# Patient Record
Sex: MALE | Race: WHITE | URBAN - METROPOLITAN AREA
[De-identification: names, ages, dates, MRNs, and addresses within clinical notes are randomized per-mention and may not be internally consistent; named-entity substitution may affect disease eponyms.]

---

## 2017-03-07 ENCOUNTER — OP HISTORICAL/CONVERTED ENCOUNTER (OUTPATIENT)
Dept: OTHER | Age: 67
End: 2017-03-07

## 2017-07-10 ENCOUNTER — OP HISTORICAL/CONVERTED ENCOUNTER (OUTPATIENT)
Dept: OTHER | Age: 67
End: 2017-07-10

## 2017-10-02 ENCOUNTER — OP HISTORICAL/CONVERTED ENCOUNTER (OUTPATIENT)
Dept: OTHER | Age: 67
End: 2017-10-02

## 2017-10-03 ENCOUNTER — OP HISTORICAL/CONVERTED ENCOUNTER (OUTPATIENT)
Dept: OTHER | Age: 67
End: 2017-10-03

## 2017-11-02 ENCOUNTER — OP HISTORICAL/CONVERTED ENCOUNTER (OUTPATIENT)
Dept: OTHER | Age: 67
End: 2017-11-02

## 2017-12-04 ENCOUNTER — OP HISTORICAL/CONVERTED ENCOUNTER (OUTPATIENT)
Dept: OTHER | Age: 67
End: 2017-12-04

## 2018-01-03 ENCOUNTER — OP HISTORICAL/CONVERTED ENCOUNTER (OUTPATIENT)
Dept: OTHER | Age: 68
End: 2018-01-03

## 2018-01-09 ENCOUNTER — IP HISTORICAL/CONVERTED ENCOUNTER (OUTPATIENT)
Dept: OTHER | Age: 68
End: 2018-01-09

## 2018-01-15 ENCOUNTER — OP HISTORICAL/CONVERTED ENCOUNTER (OUTPATIENT)
Dept: OTHER | Age: 68
End: 2018-01-15

## 2018-01-19 ENCOUNTER — ED HISTORICAL/CONVERTED ENCOUNTER (OUTPATIENT)
Dept: OTHER | Age: 68
End: 2018-01-19

## 2019-01-28 ENCOUNTER — OP HISTORICAL/CONVERTED ENCOUNTER (OUTPATIENT)
Dept: OTHER | Age: 69
End: 2019-01-28

## 2021-01-26 ENCOUNTER — TELEPHONE (OUTPATIENT)
Dept: FAMILY MEDICINE CLINIC | Age: 71
End: 2021-01-26

## 2021-01-26 ENCOUNTER — TRANSCRIBE ORDER (OUTPATIENT)
Dept: FAMILY MEDICINE CLINIC | Age: 71
End: 2021-01-26

## 2021-02-03 NOTE — TELEPHONE ENCOUNTER
Patient scheduled for:  Appointment Information   Name: Iona Carter MRN: [id number]    Date: 3/1/2021 Status: [facility]    Time: 9:00 AM Length: 30 [id number]   Visit Type: VV SPECIAL USE CASE [[id number]] Copay: $0.00    Provider: Shaylee Herman MD Department: [facility]    Referral #:   Referral Status:      Referring Provider:   Patient Type:      Notes: new patient, est pcp, BLOOD PRESSURE---wife, Mrs. Bañuelos Parents will be with patient, cell---[phone], SATNAM  r/s VV NP est PCP, per request of the wife Asha Post to see Dr. Myra Bermudez in early March, wifi yes, camera yes, ins yes, State: [place], phone: [phone] doxy. me, $0 CP  2/3/2021      Close enc    Thanks  Zora Feng S/PSR  [facility] Referral Coordinator

## 2021-02-03 NOTE — TELEPHONE ENCOUNTER
----- Message from Tahir Abrams sent at 1/26/2021  8:40 AM EST -----  Regarding: Dr. Surinder Rodrigues Message/Vendor Calls    Caller's first and last name:  Rivera Harper, Pt's wife      Reason for call:  Pt's wife requesting to schedule New Patient Appointment in-office for her . Pt prefers male provider. Callback required yes/no and why:  Yes. Pt's wife requesting in-office New Patient Appointment.       Best contact number(s):  [phone]      Details to clarify the request:      Tahir Abrams

## 2021-03-01 ENCOUNTER — VIRTUAL VISIT (OUTPATIENT)
Dept: FAMILY MEDICINE CLINIC | Age: 71
End: 2021-03-01
Payer: MEDICARE

## 2021-03-01 DIAGNOSIS — I10 ESSENTIAL HYPERTENSION: Primary | ICD-10-CM

## 2021-03-01 DIAGNOSIS — E78.2 MIXED HYPERLIPIDEMIA: ICD-10-CM

## 2021-03-01 DIAGNOSIS — M10.9 GOUT, UNSPECIFIED CAUSE, UNSPECIFIED CHRONICITY, UNSPECIFIED SITE: ICD-10-CM

## 2021-03-01 DIAGNOSIS — K21.9 GASTROESOPHAGEAL REFLUX DISEASE, UNSPECIFIED WHETHER ESOPHAGITIS PRESENT: ICD-10-CM

## 2021-03-01 DIAGNOSIS — I25.10 CORONARY ARTERY DISEASE INVOLVING NATIVE CORONARY ARTERY OF NATIVE HEART WITHOUT ANGINA PECTORIS: ICD-10-CM

## 2021-03-01 DIAGNOSIS — J30.89 ENVIRONMENTAL AND SEASONAL ALLERGIES: ICD-10-CM

## 2021-03-01 PROCEDURE — 1101F PT FALLS ASSESS-DOCD LE1/YR: CPT | Performed by: FAMILY MEDICINE

## 2021-03-01 PROCEDURE — G0463 HOSPITAL OUTPT CLINIC VISIT: HCPCS | Performed by: FAMILY MEDICINE

## 2021-03-01 PROCEDURE — G8427 DOCREV CUR MEDS BY ELIG CLIN: HCPCS | Performed by: FAMILY MEDICINE

## 2021-03-01 PROCEDURE — G8432 DEP SCR NOT DOC, RNG: HCPCS | Performed by: FAMILY MEDICINE

## 2021-03-01 PROCEDURE — G8756 NO BP MEASURE DOC: HCPCS | Performed by: FAMILY MEDICINE

## 2021-03-01 PROCEDURE — 3017F COLORECTAL CA SCREEN DOC REV: CPT | Performed by: FAMILY MEDICINE

## 2021-03-01 PROCEDURE — 99204 OFFICE O/P NEW MOD 45 MIN: CPT | Performed by: FAMILY MEDICINE

## 2021-03-01 RX ORDER — ALLOPURINOL 300 MG/1
TABLET ORAL
Qty: 90 TAB | Refills: 0 | Status: SHIPPED | OUTPATIENT
Start: 2021-03-01 | End: 2021-07-27

## 2021-03-01 RX ORDER — OMEPRAZOLE 20 MG/1
CAPSULE, DELAYED RELEASE ORAL
Qty: 90 CAP | Refills: 0 | Status: SHIPPED | OUTPATIENT
Start: 2021-03-01 | End: 2021-07-27

## 2021-03-01 RX ORDER — LISINOPRIL 40 MG/1
TABLET ORAL
COMMUNITY
Start: 2021-02-24 | End: 2021-03-01 | Stop reason: SDUPTHER

## 2021-03-01 RX ORDER — AMLODIPINE BESYLATE 5 MG/1
TABLET ORAL
Qty: 90 TAB | Refills: 0 | Status: SHIPPED | OUTPATIENT
Start: 2021-03-01 | End: 2021-04-26

## 2021-03-01 RX ORDER — AMLODIPINE BESYLATE 5 MG/1
TABLET ORAL
COMMUNITY
Start: 2020-12-06 | End: 2021-03-01 | Stop reason: SDUPTHER

## 2021-03-01 RX ORDER — METOPROLOL SUCCINATE 25 MG/1
TABLET, EXTENDED RELEASE ORAL
Qty: 90 TAB | Refills: 0 | Status: SHIPPED | OUTPATIENT
Start: 2021-03-01 | End: 2021-08-27

## 2021-03-01 RX ORDER — PRAVASTATIN SODIUM 40 MG/1
40 TABLET ORAL
COMMUNITY
End: 2021-03-01 | Stop reason: SDUPTHER

## 2021-03-01 RX ORDER — MONTELUKAST SODIUM 10 MG/1
10 TABLET ORAL DAILY
Qty: 90 TAB | Refills: 0 | Status: SHIPPED | OUTPATIENT
Start: 2021-03-01 | End: 2021-08-12

## 2021-03-01 RX ORDER — PRAVASTATIN SODIUM 40 MG/1
40 TABLET ORAL
Qty: 90 TAB | Refills: 0 | Status: SHIPPED | OUTPATIENT
Start: 2021-03-01 | End: 2021-05-28

## 2021-03-01 RX ORDER — METOPROLOL SUCCINATE 25 MG/1
TABLET, EXTENDED RELEASE ORAL
COMMUNITY
Start: 2020-12-07 | End: 2021-03-01 | Stop reason: SDUPTHER

## 2021-03-01 RX ORDER — MONTELUKAST SODIUM 10 MG/1
TABLET ORAL
COMMUNITY
Start: 2021-02-26 | End: 2021-03-01 | Stop reason: SDUPTHER

## 2021-03-01 RX ORDER — ASPIRIN 81 MG/1
81 TABLET ORAL DAILY
COMMUNITY

## 2021-03-01 RX ORDER — OMEPRAZOLE 20 MG/1
CAPSULE, DELAYED RELEASE ORAL
COMMUNITY
Start: 2021-01-26 | End: 2021-03-01 | Stop reason: SDUPTHER

## 2021-03-01 RX ORDER — ALLOPURINOL 300 MG/1
TABLET ORAL
COMMUNITY
Start: 2021-01-26 | End: 2021-03-01 | Stop reason: SDUPTHER

## 2021-03-01 RX ORDER — CHOLECALCIFEROL (VITAMIN D3) 125 MCG
1 CAPSULE ORAL DAILY
COMMUNITY
End: 2021-04-30 | Stop reason: SDUPTHER

## 2021-03-01 RX ORDER — LISINOPRIL 40 MG/1
40 TABLET ORAL DAILY
Qty: 90 TAB | Refills: 0 | Status: SHIPPED | OUTPATIENT
Start: 2021-03-01 | End: 2021-08-24

## 2021-03-01 NOTE — PROGRESS NOTES
Adore Hensley.  78 y.o. male  1950  [facility]  [id number]   [address]:    Telemedicine Progress Note  Frances Nunez MD       Encounter Date and Time: March 1, 2021 at 9:23 AM    Consent: Adore Gibbs, who was seen by synchronous (real-time) audio-video technology, and/or his healthcare decision maker, is aware that this patient-initiated, Telehealth encounter on 3/1/2021 is a billable service, with coverage as determined by his insurance carrier. He is aware that he may receive a bill and has provided verbal consent to proceed: Yes. Chief Complaint   Patient presents with   [address]    Hypertension     History of Present Illness   Adore Gibbs is a 78 y.o. male was evaluated by synchronous (real-time) audio-video technology from home, through a secure patient portal.  Previous followed by Dr. Caio Mazariegos there is retiring and closing his practice. .     Diarrhea: Notes he is having having stomach problems for 2 years. Once a month he gets diarrhea with unknown cause. Is not currently taking any medications for this. Previously seen by a physician for this approximately 1 year ago and was placed on omeprazole. This helped a little bit but he forgot to take the medication regularly. Is also having to change his gastroenterologist.    Hypertension/hyperlipidemia/CAD: History of cardiac stent in 2014. Currently denies chest pain. Has upcoming appointment with cardiology. Patient reported vitals:  159/84 before meds  163/81 after medications  HR 48-50    Future Appointments   Date Time Provider Michael Gaby   4/5/2021  9:40 AM Huber Davila MD [facility]     Health maintenance:  Had a few colonoscopies: Not currently due      Review of Systems   Review of Systems   Gastrointestinal: Positive for diarrhea.        Vitals/Objective:     General: alert, cooperative, no distress   Mental  status: mental status: alert, oriented to person, place, and time, normal mood, behavior, speech, dress, motor activity, and thought processes   Resp: resp: normal effort and no respiratory distress   Neuro: neuro: no gross deficits   Skin: skin: no discoloration or lesions of concern on visible areas   Due to this being a TeleHealth evaluation, many elements of the physical examination are unable to be assessed. Assessment and Plan:       1. Essential hypertension  Patient need to have medications refilled. And previously provided by his PCP. Currently on Norvasc, lisinopril and Toprol-XL. Heart rate reported as bradycardic, however patient is asymptomatic. Blood pressure appears uncontrolled at this time. Advised to continue to keep a record and discuss his upcoming appointment with his cardiologist.  Likely benefit from increasing his amlodipine and or adding another agent. Patient deferred making adjustments to medication at this time. - amLODIPine (NORVASC) 5 mg tablet; take 1 tablet by mouth daily for blood pressure  Dispense: 90 Tab; Refill: 0  - lisinopriL (PRINIVIL, ZESTRIL) 40 mg tablet; Take 1 Tab by mouth daily. Dispense: 90 Tab; Refill: 0  - metoprolol succinate (TOPROL-XL) 25 mg XL tablet; take 1 tablet by mouth daily  Dispense: 90 Tab; Refill: 0    2. Coronary artery disease involving native coronary artery of native heart without angina pectoris  History of cardiac stent. Upcoming appointment with cardiology. Currently on 81 mg aspirin daily. To complete course of antiplatelet therapies after stent placement. - amLODIPine (NORVASC) 5 mg tablet; take 1 tablet by mouth daily for blood pressure  Dispense: 90 Tab; Refill: 0  - lisinopriL (PRINIVIL, ZESTRIL) 40 mg tablet; Take 1 Tab by mouth daily. Dispense: 90 Tab; Refill: 0  - metoprolol succinate (TOPROL-XL) 25 mg XL tablet; take 1 tablet by mouth daily  Dispense: 90 Tab; Refill: 0  - pravastatin (PRAVACHOL) 40 mg tablet; Take 1 Tab by mouth nightly. Dispense: 90 Tab; Refill: 0    3. Gastroesophageal reflux disease, unspecified whether esophagitis present  Previously controlled with omeprazole. Will refill this today. - omeprazole (PRILOSEC) 20 mg capsule; take 1 capsule by mouth every morning  Dispense: 90 Cap; Refill: 0    4. Mixed hyperlipidemia  No prior records or labs available for review at time of this appointment. Requested labs be sent. We will continue Pravachol at this time. If no recent lipids on review of prior records, patient will need to get updated lab work. Known history of CAD, therefore would recommend LDL goal less than 70.  - pravastatin (PRAVACHOL) 40 mg tablet; Take 1 Tab by mouth nightly. Dispense: 90 Tab; Refill: 0    5. Environmental and seasonal allergies  We will refill Singulair at this time. - montelukast (SINGULAIR) 10 mg tablet; Take 1 Tab by mouth daily. Dispense: 90 Tab; Refill: 0    6. Gout, unspecified cause, unspecified chronicity, unspecified site  Stable on current dose of allopurinol. Will get recent lab work from prior PCP. - allopurinoL (ZYLOPRIM) 300 mg tablet; take 1 tablet by mouth once daily  Dispense: 90 Tab; Refill: 0    Follow-up and Dispositions  ·   Return in about 4 weeks (around 3/29/2021) for Hypertension. We discussed the expected course, resolution and complications of the diagnosis(es) in detail. Medication risks, benefits, costs, interactions, and alternatives were discussed as indicated. I advised him to contact the office if his condition worsens, changes or fails to improve as anticipated. He expressed understanding with the diagnosis(es) and plan. Patient understands that this virtual encounter was a temporary measure, and the importance of further follow up and examination was emphasized. Patient verbalized understanding. Electronically Signed: Alanna Dacosta MD    CPT Codes 41676-20890 for Established Patients may apply to this Telehealth Visit.   POS code: Parker Sofia is a 78 y.o. male who was evaluated by an audio-video encounter for concerns as above. Patient identification was verified prior to start of the visit. A caregiver was present when appropriate. Due to this being a TeleHealth encounter (During BXVXM-06 public health emergency), evaluation of the following organ systems was limited: Vitals/Constitutional/EENT/Resp/CV/GI//MS/Neuro/Skin/Heme-Lymph-Imm. Pursuant to the emergency declaration under the [address] [place] [address], [facility] waiver authority and the [employer] Act, this Virtual Visit was conducted, with patient's (and/or legal guardian's) consent, to reduce the patient's risk of exposure to COVID-19 and provide necessary medical care. Services were provided through a synchronous discussion virtually to substitute for in-person clinic visit. I was at home. The patient was at home. History   Patients past medical, surgical and family histories were reviewed and updated. Past Medical History:   Diagnosis Date    CAD (coronary artery disease)     Essential hypertension     Gout     Hyperlipidemia      Past Surgical History:   Procedure Laterality Date    HX CORONARY STENT PLACEMENT  2014    HX KNEE REPLACEMENT Left 2017     Family History   Problem Relation Age of Onset    Heart Failure Mother     Stroke Father     Diabetes Father     Hypertension Sister     Heart Attack Brother     Diabetes Brother     Diabetes Son     No Known Problems Son     No Known Problems Daughter      Social History     Tobacco Use    Smoking status: Never Smoker    Smokeless tobacco: Never Used   Substance Use Topics    Alcohol use: Not Currently    Drug use: Never     There is no problem list on file for this patient.          Current Medications/Allergies   Medications and Allergies reviewed:    Current Outpatient Medications   Medication Sig Dispense Refill    cholecalciferol, vitamin D3, (Vitamin D3) 50 mcg (2,000 unit) tab Take 1 Tab by mouth daily.  aspirin delayed-release 81 mg tablet Take 81 mg by mouth daily.  allopurinoL (ZYLOPRIM) 300 mg tablet take 1 tablet by mouth once daily 90 Tab 0    amLODIPine (NORVASC) 5 mg tablet take 1 tablet by mouth daily for blood pressure 90 Tab 0    lisinopriL (PRINIVIL, ZESTRIL) 40 mg tablet Take 1 Tab by mouth daily. 90 Tab 0    metoprolol succinate (TOPROL-XL) 25 mg XL tablet take 1 tablet by mouth daily 90 Tab 0    montelukast (SINGULAIR) 10 mg tablet Take 1 Tab by mouth daily. 90 Tab 0    pravastatin (PRAVACHOL) 40 mg tablet Take 1 Tab by mouth nightly.  90 Tab 0    omeprazole (PRILOSEC) 20 mg capsule take 1 capsule by mouth every morning 90 Cap 0       No Known Allergies

## 2021-03-11 PROBLEM — J30.89 ENVIRONMENTAL AND SEASONAL ALLERGIES: Status: ACTIVE | Noted: 2021-03-11

## 2021-03-11 PROBLEM — K21.9 GASTROESOPHAGEAL REFLUX DISEASE: Status: ACTIVE | Noted: 2021-03-11

## 2021-03-24 ENCOUNTER — TELEPHONE (OUTPATIENT)
Dept: FAMILY MEDICINE CLINIC | Age: 71
End: 2021-03-24

## 2021-03-26 NOTE — TELEPHONE ENCOUNTER
----- Message from Margret Lucas sent at 3/24/2021 10:21 AM EDT -----  Regarding: Dr Kiran Khan  General Message/Vendor Calls    Caller's first and last name: Patricio Freeman, Wife      Reason for call: Medical records received (?)      Callback required yes/no and why: Yes, they would like to know if pt's medical records have been received and if not what the next steps should be as the pt's former PCP has closed the office and they are unable to get in touch with them. Best contact number(s): [phone]      Details to clarify the request: Within the last 2 weeks the pt's medical records should have been faxed to your office.  Have they been received (?)      Margret Lucas

## 2021-04-26 ENCOUNTER — OFFICE VISIT (OUTPATIENT)
Dept: CARDIOLOGY CLINIC | Age: 71
End: 2021-04-26
Payer: MEDICARE

## 2021-04-26 VITALS
HEART RATE: 49 BPM | WEIGHT: 275 LBS | SYSTOLIC BLOOD PRESSURE: 170 MMHG | DIASTOLIC BLOOD PRESSURE: 110 MMHG | BODY MASS INDEX: 39.37 KG/M2 | OXYGEN SATURATION: 98 % | HEIGHT: 70 IN

## 2021-04-26 DIAGNOSIS — I10 ESSENTIAL HYPERTENSION: ICD-10-CM

## 2021-04-26 DIAGNOSIS — I25.10 CORONARY ARTERY DISEASE INVOLVING NATIVE CORONARY ARTERY OF NATIVE HEART WITHOUT ANGINA PECTORIS: Primary | ICD-10-CM

## 2021-04-26 DIAGNOSIS — E78.00 PURE HYPERCHOLESTEROLEMIA: ICD-10-CM

## 2021-04-26 PROCEDURE — G8427 DOCREV CUR MEDS BY ELIG CLIN: HCPCS | Performed by: SPECIALIST

## 2021-04-26 PROCEDURE — G8536 NO DOC ELDER MAL SCRN: HCPCS | Performed by: SPECIALIST

## 2021-04-26 PROCEDURE — 99204 OFFICE O/P NEW MOD 45 MIN: CPT | Performed by: SPECIALIST

## 2021-04-26 PROCEDURE — 93005 ELECTROCARDIOGRAM TRACING: CPT | Performed by: SPECIALIST

## 2021-04-26 PROCEDURE — 1101F PT FALLS ASSESS-DOCD LE1/YR: CPT | Performed by: SPECIALIST

## 2021-04-26 PROCEDURE — 3017F COLORECTAL CA SCREEN DOC REV: CPT | Performed by: SPECIALIST

## 2021-04-26 PROCEDURE — G8417 CALC BMI ABV UP PARAM F/U: HCPCS | Performed by: SPECIALIST

## 2021-04-26 PROCEDURE — 93010 ELECTROCARDIOGRAM REPORT: CPT | Performed by: SPECIALIST

## 2021-04-26 PROCEDURE — G8755 DIAS BP > OR = 90: HCPCS | Performed by: SPECIALIST

## 2021-04-26 PROCEDURE — G8753 SYS BP > OR = 140: HCPCS | Performed by: SPECIALIST

## 2021-04-26 PROCEDURE — G8432 DEP SCR NOT DOC, RNG: HCPCS | Performed by: SPECIALIST

## 2021-04-26 PROCEDURE — G0463 HOSPITAL OUTPT CLINIC VISIT: HCPCS | Performed by: SPECIALIST

## 2021-04-26 RX ORDER — AMLODIPINE BESYLATE 10 MG/1
TABLET ORAL
Qty: 30 TAB | Refills: 5 | Status: SHIPPED | OUTPATIENT
Start: 2021-04-26 | End: 2021-10-26

## 2021-04-26 NOTE — PATIENT INSTRUCTIONS
1) lexiscan cardiolite    2) fasting cholesterol at lab [facility]    3) will do sleep evaluation    4) increase the norvasc(amlodipine) to 10 mg a day    5) follow up in 6 weeks    Research Glycemic Index and Glycemic Load on the internet. Vegetables that are low in glycemic Index include artichokes, asparagus, bean sprouts, broccoli, brussels sprouts and cauliflower. Fruits that are low in glycemic index include cherries, grapefruit, dried apricots, pears, apples, oranges, plums and strawberry.       Consider medi weight loss

## 2021-04-26 NOTE — PROGRESS NOTES
René Cheney. is a 78 y.o. male    Visit Vitals  BP (!) 170/110 (BP 1 Location: Left upper arm, BP Patient Position: Sitting, BP Cuff Size: Adult)   Pulse (!) 49   Ht 5' 10\" (1.778 m)   Wt 275 lb (124.7 kg)   SpO2 98%   BMI 39.46 kg/m²       Chief Complaint   Patient presents with    Cholesterol Problem    Coronary Artery Disease    Hypertension       Chest pain SOMETIMES  SOB NO  Dizziness NO  Swelling NO  Recent hospital visit  NO  Refills VITAMIN D3

## 2021-04-26 NOTE — PROGRESS NOTES
CARDIOLOGY OFFICE NOTE    Clayton Champion MD, [address], [place], [address] [facility]  Phone [phone]; Fax [phone]  Mobile [phone]   Voice Mail [phone]    LAST OFFICE VISIT : Visit date not found  Alireza Montemayor MD       ATTENTION:   This medical record was transcribed using an electronic medical records/speech recognition system. Although proofread, it may and can contain electronic, spelling and other errors. Corrections may be executed at a later time. Please feel free to contact us for any clarifications as needed. ICD-10-CM ICD-9-CM   1. Coronary artery disease involving native coronary artery of native heart without angina pectoris  I25.10 414.01   2. Essential hypertension  I10 401.9   3. Pure hypercholesterolemia  E78.00 272.0            Monica Crowe is a 78 y.o. male with  referred for history of CAD stenting LAD, dyslipidemia, hypertension, possible JOVI    . The patient denies chest pain/ shortness of breath, orthopnea, PND, LE edema, palpitations, syncope, presyncope or fatigue. Cardiac risk factors: dyslipidemia, obesity, sedentary life style, male gender, hypertension  I have personally obtained the history from the patient. HISTORY OF PRESENTING ILLNESS      He has been followed by [facility] cardiology in [place]. Is a history of CAD status post stenting of his LAD and 2014. He is states he occasionally gets a little twinge of chest discomfort in his left upper chest but nothing similar to 2014. That was indigestion-like sensation that was relieved actually with a Pepsi or carbonated beverage. He is not very active because of knee pain so he does not walk a lot. He has continued to gain weight over the years. Comes in today with his wife.        ACTIVE PROBLEM LIST     Patient Active Problem List    Diagnosis Date Noted    Gastroesophageal reflux disease 03/11/2021    Environmental and seasonal allergies 03/11/2021    Coronary artery disease involving native coronary artery of native heart without angina pectoris     Essential hypertension     Hyperlipidemia     Gout            PAST MEDICAL HISTORY     Past Medical History:   Diagnosis Date    CAD (coronary artery disease)     Essential hypertension     Gout     Hyperlipidemia            PAST SURGICAL HISTORY     Past Surgical History:   Procedure Laterality Date    HX CORONARY STENT PLACEMENT  2014    HX KNEE REPLACEMENT Left 2017          ALLERGIES     No Known Allergies       FAMILY HISTORY     Family History   Problem Relation Age of Onset    Heart Failure Mother     Stroke Father     Diabetes Father     Hypertension Sister     Heart Attack Brother     Diabetes Brother     Diabetes Son     No Known Problems Son     No Known Problems Daughter     negative for cardiac disease       SOCIAL HISTORY     Social History     Socioeconomic History    Marital status:      Spouse name: German Webb Number of children: 3    Years of education: Not on file    Highest education level: Not on file   Occupational History    Occupation: Retired - [employer]   Tobacco Use    Smoking status: Never Smoker    Smokeless tobacco: Never Used   Substance and Sexual Activity    Alcohol use: Not Currently    Drug use: Never         MEDICATIONS     Current Outpatient Medications   Medication Sig    amLODIPine (NORVASC) 10 mg tablet take 1 tablet by mouth daily for blood pressure    cholecalciferol, vitamin D3, (Vitamin D3) 50 mcg (2,000 unit) tab Take 1 Tab by mouth daily.  aspirin delayed-release 81 mg tablet Take 81 mg by mouth daily.  allopurinoL (ZYLOPRIM) 300 mg tablet take 1 tablet by mouth once daily    lisinopriL (PRINIVIL, ZESTRIL) 40 mg tablet Take 1 Tab by mouth daily.  metoprolol succinate (TOPROL-XL) 25 mg XL tablet take 1 tablet by mouth daily    montelukast (SINGULAIR) 10 mg tablet Take 1 Tab by mouth daily.     pravastatin (PRAVACHOL) 40 mg tablet Take 1 Tab by mouth nightly.  omeprazole (PRILOSEC) 20 mg capsule take 1 capsule by mouth every morning     No current facility-administered medications for this visit. I have reviewed the nurses notes, vitals, problem list, allergy list, medical history, family, social history and medications. REVIEW OF SYMPTOMS   Pertinent positives per HPI  General: Pt denies excessive weight gain or loss. Pt is able to conduct ADL's  HEENT: Denies blurred vision, headaches, hearing loss, epistaxis and difficulty swallowing. Respiratory: Denies cough, congestion, shortness of breath, MENA, wheezing or stridor. Cardiovascular: Denies precordial pain, palpitations, edema or PND  Gastrointestinal: Denies poor appetite, indigestion, abdominal pain or blood in stool  Genitourinary: Denies hematuria, dysuria, increased urinary frequency  Musculoskeletal: Denies joint pain or swelling from muscles or joints  Neurologic: Denies tremor, paresthesias, headache, or sensory motor disturbance  Psychiatric: Denies confusion, insomnia, depression  Integumentray: Denies rash, itching or ulcers. Hematologic: Denies easy bruising, bleeding     PHYSICAL EXAMINATION      Vitals:    04/26/21 1113   BP: (!) 170/110   Pulse: (!) 49   SpO2: 98%   Weight: 275 lb (124.7 kg)   Height: 5' 10\" (1.778 m)     General: Well developed, in no acute distress. HEENT: No jaundice, oral mucosa moist, no oral ulcers  Neck: Supple, no stiffness, no lymphadenopathy, supple  Heart:   Slow and regular  Respiratory: Clear bilaterally x 4, no wheezing or rales  Abdomen:   Soft, non-tender, bowel sounds are active. Extremities:  No edema, normal cap refill, no cyanosis. Musculoskeletal: No clubbing, no deformities  Neuro: A&Ox3, speech clear, gait stable, cooperative, no focal neurologic deficits  Skin: Skin color is normal. No rashes or lesions.  Non diaphoretic, moist. Slight changes lower extremity of venous insufficiency        EKG: I do not have a date on his recent EKG but showed a sinus bradycardia     DIAGNOSTIC DATA     1. Cardiac Cath  2014- stent to LAD    2. Echo  6/24/19- EF 60-65%, mild concentric LV hypertrophy, inferior wall appears mildly hypokinetic, trileaflet AV, aortic root mildly dilated, aortic root diameter 3.8 cm         LABORATORY DATA          No results found for: WBC, HGBPOC, HGB, HGBP, HCTPOC, HCT, PHCT, RBCH, PLT, MCV, HGBEXT, HCTEXT, PLTEXT, HGBEXT, HCTEXT, PLTEXT   No results found for: NA, K, CL, CO2, AGAP, GLU, BUN, CREA, BUCR, GFRAA, GFRNA, CA, TBIL, TBILI, AP, TP, ALB, GLOB, AGRAT, ALT        ASSESSMENT/RECOMMENDATIONS:.   1 hypertension  -Blood pressure is elevated but apparently has not been taking his medicines so will give him at least time until a stress test to recheck his blood pressure was significantly elevated today by his wife and on account he did not take any of his medicines  -encourage weight reduction and exercise; suggest he get a hand foot bike  -Encouraged him to stop the sodas as they may have a significant amount of sodium present  2 dyslipidemia  -Did not have a recent cholesterol on him and his LDL goal should be 70.  -Given a requisition to have his cholesterol  3 CAD  -History of stenting of the LAD back 2014. Now 7 years we will do a stress test last echo was in 2019 with normal EF but he does have LVH  4.  Possible JOVI  -Go forward for with a sleep evaluation      Return in 6 to 8 weeks or as needed       Orders Placed This Encounter    LIPID PANEL     Standing Status:   Future     Standing Expiration Date:   4/26/2022    HEPATIC FUNCTION PANEL     Standing Status:   Future     Standing Expiration Date:   4/26/2022    AMB POC EKG ROUTINE W/ 12 LEADS, INTER & REP     Order Specific Question:   Reason for Exam:     Answer:   CHOL    amLODIPine (NORVASC) 10 mg tablet     Sig: take 1 tablet by mouth daily for blood pressure     Dispense:  30 Tab     Refill:  5 We discussed the expected course, resolution and complications of the diagnosis(es) in detail. Medication risks, benefits, costs, interactions, and alternatives were discussed as indicated. I advised him to contact the office if his condition worsens, changes or fails to improve as anticipated. He expressed understanding with the diagnosis(es) and plan          Follow-up and Dispositions  ·   Return in about 6 weeks (around 6/7/2021). I have discussed the diagnosis with  Gertrude Gao and the intended plan as seen in the above orders. Questions were answered concerning future plans. I have discussed medication side effects and warnings with the patient as well. Thank you,  Vivi Noguera MD for involving me in the care of  Gertrudejohn Alvarado. . Please do not hesitate to contact me for further questions/concerns. Clayton Davila MD, [facility] [address]      [phone] / [phone] Fax

## 2021-04-30 RX ORDER — CHOLECALCIFEROL (VITAMIN D3) 125 MCG
1 CAPSULE ORAL DAILY
Qty: 90 TAB | Refills: 3 | Status: SHIPPED | OUTPATIENT
Start: 2021-04-30 | End: 2022-05-08

## 2021-05-13 ENCOUNTER — TELEPHONE (OUTPATIENT)
Dept: CARDIOLOGY CLINIC | Age: 71
End: 2021-05-13

## 2021-05-13 ENCOUNTER — ANCILLARY PROCEDURE (OUTPATIENT)
Dept: CARDIOLOGY CLINIC | Age: 71
End: 2021-05-13
Payer: MEDICARE

## 2021-05-13 VITALS — HEIGHT: 70 IN | BODY MASS INDEX: 37.22 KG/M2 | WEIGHT: 260 LBS

## 2021-05-13 DIAGNOSIS — I10 ESSENTIAL HYPERTENSION: ICD-10-CM

## 2021-05-13 DIAGNOSIS — I25.10 CORONARY ARTERY DISEASE INVOLVING NATIVE CORONARY ARTERY OF NATIVE HEART WITHOUT ANGINA PECTORIS: ICD-10-CM

## 2021-05-13 DIAGNOSIS — E78.00 PURE HYPERCHOLESTEROLEMIA: ICD-10-CM

## 2021-05-13 PROCEDURE — 93018 CV STRESS TEST I&R ONLY: CPT | Performed by: SPECIALIST

## 2021-05-13 PROCEDURE — 93016 CV STRESS TEST SUPVJ ONLY: CPT | Performed by: SPECIALIST

## 2021-05-13 PROCEDURE — A9500 TC99M SESTAMIBI: HCPCS | Performed by: SPECIALIST

## 2021-05-13 PROCEDURE — 78452 HT MUSCLE IMAGE SPECT MULT: CPT | Performed by: SPECIALIST

## 2021-05-13 RX ORDER — TETRAKIS(2-METHOXYISOBUTYLISOCYANIDE)COPPER(I) TETRAFLUOROBORATE 1 MG/ML
24.3 INJECTION, POWDER, LYOPHILIZED, FOR SOLUTION INTRAVENOUS ONCE
Status: COMPLETED | OUTPATIENT
Start: 2021-05-13 | End: 2021-05-13

## 2021-05-13 RX ADMIN — TECHNETIUM TC 99M SESTAMIBI 24.3 MILLICURIE: 1 INJECTION, POWDER, FOR SOLUTION INTRAVENOUS at 14:00

## 2021-05-13 RX ADMIN — REGADENOSON 0.4 MG: 0.08 INJECTION, SOLUTION INTRAVENOUS at 14:48

## 2021-05-13 NOTE — TELEPHONE ENCOUNTER
Returned pt call, ID [id number]. Pt had  called inquiring which medication to hold before Nuclear stress test. I informed the pt he should NOT take his Toprol this morning. I reviewed the rest of the stress test instructions. Pt expressed understanding and agreement. Pt has no further questions or concerns at this time.

## 2021-05-13 NOTE — TELEPHONE ENCOUNTER
Patient's wife calling in regards to nuclear today. Would like to know if he can still take his medication.     Phone : [phone]

## 2021-05-14 ENCOUNTER — APPOINTMENT (OUTPATIENT)
Dept: CARDIOLOGY CLINIC | Age: 71
End: 2021-05-14

## 2021-05-14 RX ORDER — TETRAKIS(2-METHOXYISOBUTYLISOCYANIDE)COPPER(I) TETRAFLUOROBORATE 1 MG/ML
40 INJECTION, POWDER, LYOPHILIZED, FOR SOLUTION INTRAVENOUS ONCE
Status: COMPLETED | OUTPATIENT
Start: 2021-05-14 | End: 2021-05-14

## 2021-05-14 RX ADMIN — TECHNETIUM TC 99M SESTAMIBI 26.4 MILLICURIE: 1 INJECTION, POWDER, FOR SOLUTION INTRAVENOUS at 14:05

## 2021-05-17 LAB
STRESS BASELINE DIAS BP: 72 MMHG
STRESS BASELINE HR: 45 BPM
STRESS BASELINE SYS BP: 126 MMHG
STRESS O2 SAT PEAK: 98 %
STRESS O2 SAT REST: 97 %
STRESS PEAK DIAS BP: 82 MMHG
STRESS PEAK SYS BP: 142 MMHG
STRESS PERCENT HR ACHIEVED: 41 %
STRESS POST PEAK HR: 62 BPM
STRESS RATE PRESSURE PRODUCT: 8804 BPM*MMHG
STRESS ST DEPRESSION: 0 MM
STRESS ST ELEVATION: 0 MM
STRESS TARGET HR: 152 BPM

## 2021-05-23 DIAGNOSIS — E78.2 MIXED HYPERLIPIDEMIA: ICD-10-CM

## 2021-05-23 DIAGNOSIS — I25.10 CORONARY ARTERY DISEASE INVOLVING NATIVE CORONARY ARTERY OF NATIVE HEART WITHOUT ANGINA PECTORIS: ICD-10-CM

## 2021-05-28 RX ORDER — PRAVASTATIN SODIUM 40 MG/1
TABLET ORAL
Qty: 90 TABLET | Refills: 0 | Status: SHIPPED | OUTPATIENT
Start: 2021-05-28 | End: 2021-06-11

## 2021-06-03 LAB
ALBUMIN SERPL-MCNC: 4.2 G/DL (ref 3.8–4.8)
ALP SERPL-CCNC: 88 IU/L (ref 48–121)
ALT SERPL-CCNC: 21 IU/L (ref 0–44)
AST SERPL-CCNC: 19 IU/L (ref 0–40)
BILIRUB DIRECT SERPL-MCNC: 0.12 MG/DL (ref 0–0.4)
BILIRUB SERPL-MCNC: 0.4 MG/DL (ref 0–1.2)
CHOLEST SERPL-MCNC: 199 MG/DL (ref 100–199)
HDLC SERPL-MCNC: 52 MG/DL
LDLC SERPL CALC-MCNC: 126 MG/DL (ref 0–99)
PROT SERPL-MCNC: 6.8 G/DL (ref 6–8.5)
SPECIMEN STATUS REPORT, ROLRST: NORMAL
TRIGL SERPL-MCNC: 115 MG/DL (ref 0–149)
VLDLC SERPL CALC-MCNC: 21 MG/DL (ref 5–40)

## 2021-06-07 DIAGNOSIS — E78.00 PURE HYPERCHOLESTEROLEMIA: Primary | ICD-10-CM

## 2021-06-07 DIAGNOSIS — I25.10 CORONARY ARTERY DISEASE INVOLVING NATIVE CORONARY ARTERY OF NATIVE HEART WITHOUT ANGINA PECTORIS: ICD-10-CM

## 2021-06-07 NOTE — TELEPHONE ENCOUNTER
Your cholesterol numbers are not at goal. To provide you with the best heart health the LDL should be under 100 if you have no heart disease or history of diabetes. If you have a history of diabetes or heart disease the LDL goal should be less than 70. I would like you to stop the Pravachol and begin Crestor 20 mg a day and recheck your cholesterol in 2 months. Take care and stay healthy.

## 2021-06-11 ENCOUNTER — OFFICE VISIT (OUTPATIENT)
Dept: CARDIOLOGY CLINIC | Age: 71
End: 2021-06-11
Payer: MEDICARE

## 2021-06-11 VITALS
HEART RATE: 56 BPM | SYSTOLIC BLOOD PRESSURE: 135 MMHG | DIASTOLIC BLOOD PRESSURE: 65 MMHG | HEIGHT: 70 IN | BODY MASS INDEX: 37.51 KG/M2 | WEIGHT: 262 LBS

## 2021-06-11 DIAGNOSIS — I25.10 CORONARY ARTERY DISEASE INVOLVING NATIVE CORONARY ARTERY OF NATIVE HEART WITHOUT ANGINA PECTORIS: ICD-10-CM

## 2021-06-11 DIAGNOSIS — I10 ESSENTIAL HYPERTENSION: Primary | ICD-10-CM

## 2021-06-11 DIAGNOSIS — E78.00 PURE HYPERCHOLESTEROLEMIA: ICD-10-CM

## 2021-06-11 PROCEDURE — G8427 DOCREV CUR MEDS BY ELIG CLIN: HCPCS | Performed by: SPECIALIST

## 2021-06-11 PROCEDURE — G8432 DEP SCR NOT DOC, RNG: HCPCS | Performed by: SPECIALIST

## 2021-06-11 PROCEDURE — 3017F COLORECTAL CA SCREEN DOC REV: CPT | Performed by: SPECIALIST

## 2021-06-11 PROCEDURE — G8754 DIAS BP LESS 90: HCPCS | Performed by: SPECIALIST

## 2021-06-11 PROCEDURE — G8417 CALC BMI ABV UP PARAM F/U: HCPCS | Performed by: SPECIALIST

## 2021-06-11 PROCEDURE — G8752 SYS BP LESS 140: HCPCS | Performed by: SPECIALIST

## 2021-06-11 PROCEDURE — 99214 OFFICE O/P EST MOD 30 MIN: CPT | Performed by: SPECIALIST

## 2021-06-11 PROCEDURE — 1101F PT FALLS ASSESS-DOCD LE1/YR: CPT | Performed by: SPECIALIST

## 2021-06-11 PROCEDURE — G0463 HOSPITAL OUTPT CLINIC VISIT: HCPCS | Performed by: SPECIALIST

## 2021-06-11 PROCEDURE — G8536 NO DOC ELDER MAL SCRN: HCPCS | Performed by: SPECIALIST

## 2021-06-11 RX ORDER — ROSUVASTATIN CALCIUM 20 MG/1
20 TABLET, COATED ORAL
Qty: 30 TABLET | Refills: 5 | Status: SHIPPED | OUTPATIENT
Start: 2021-06-11 | End: 2021-12-09

## 2021-06-11 NOTE — PROGRESS NOTES
CARDIOLOGY OFFICE NOTE    Clayton Jack MD, [address], [place], [address] [facility]  Phone [phone]; Fax [phone]  Mobile [phone]   Voice Mail [phone]    LAST OFFICE VISIT : Visit date not found  Ashley Randolph MD       ATTENTION:   This medical record was transcribed using an electronic medical records/speech recognition system. Although proofread, it may and can contain electronic, spelling and other errors. Corrections may be executed at a later time. Please feel free to contact us for any clarifications as needed. ICD-10-CM ICD-9-CM   1. Essential hypertension  I10 401.9   2. Coronary artery disease involving native coronary artery of native heart without angina pectoris  I25.10 414.01   3. Pure hypercholesterolemia  E78.00 272.0            Eli Barclay is a 78 y.o. male with  referred for history of CAD stenting LAD, dyslipidemia, hypertension, possible JOVI    . The patient denies chest pain/ shortness of breath, orthopnea, PND, LE edema, palpitations, syncope, presyncope or fatigue. Cardiac risk factors: dyslipidemia, obesity, sedentary life style, male gender, hypertension  I have personally obtained the history from the patient. HISTORY OF PRESENTING ILLNESS   From prior note  He has been followed by [facility] cardiology in [place]. Is a history of CAD status post stenting of his LAD and 2014. He is states he occasionally gets a little twinge of chest discomfort in his left upper chest but nothing similar to 2014. That was indigestion-like sensation that was relieved actually with a Pepsi or carbonated beverage. He is not very active because of knee pain so he does not walk a lot. He has continued to gain weight over the years. He is doing well with no interval cardiac issues.   No chest pain or shortness of breath comes in to review all of his testing       ACTIVE PROBLEM LIST     Patient Active Problem List Diagnosis Date Noted    Gastroesophageal reflux disease 03/11/2021    Environmental and seasonal allergies 03/11/2021    Coronary artery disease involving native coronary artery of native heart without angina pectoris     Essential hypertension     Hyperlipidemia     Gout            PAST MEDICAL HISTORY     Past Medical History:   Diagnosis Date    CAD (coronary artery disease)     Essential hypertension     Gout     Hyperlipidemia            PAST SURGICAL HISTORY     Past Surgical History:   Procedure Laterality Date    HX CORONARY STENT PLACEMENT  2014    HX KNEE REPLACEMENT Left 2017          ALLERGIES     No Known Allergies       FAMILY HISTORY     Family History   Problem Relation Age of Onset    Heart Failure Mother     Stroke Father     Diabetes Father     Hypertension Sister     Heart Attack Brother     Diabetes Brother     Diabetes Son     No Known Problems Son     No Known Problems Daughter     negative for cardiac disease       SOCIAL HISTORY     Social History     Socioeconomic History    Marital status:      Spouse name: Tru Rinaldi Number of children: 3    Years of education: Not on file    Highest education level: Not on file   Occupational History    Occupation: Retired - [employer]   Tobacco Use    Smoking status: Never Smoker    Smokeless tobacco: Never Used   Substance and Sexual Activity    Alcohol use: Not Currently    Drug use: Never     Social Determinants of Health     Financial Resource Strain:     Difficulty of Paying Living Expenses:    Food Insecurity:     Worried About Running Out of Food in the Last Year:     [address] in the Last Year:    Transportation Needs:     Lack of Transportation (Medical):      Lack of Transportation (Non-Medical):    Physical Activity:     Days of Exercise per Week:     Minutes of Exercise per Session:    Stress:     Feeling of Stress :    Social Connections:     Frequency of Communication with Friends and Family:     Frequency of Social Gatherings with Friends and Family:     Attends Sabianism Services:     Active Member of Clubs or Organizations:     Attends Club or Organization Meetings:     Marital Status:          MEDICATIONS     Current Outpatient Medications   Medication Sig    pravastatin (PRAVACHOL) 40 mg tablet take 1 tablet by mouth nightly    cholecalciferol, vitamin D3, (Vitamin D3) 50 mcg (2,000 unit) tab Take 1 Tab by mouth daily.  amLODIPine (NORVASC) 10 mg tablet take 1 tablet by mouth daily for blood pressure    aspirin delayed-release 81 mg tablet Take 81 mg by mouth daily.  allopurinoL (ZYLOPRIM) 300 mg tablet take 1 tablet by mouth once daily    lisinopriL (PRINIVIL, ZESTRIL) 40 mg tablet Take 1 Tab by mouth daily.  metoprolol succinate (TOPROL-XL) 25 mg XL tablet take 1 tablet by mouth daily    montelukast (SINGULAIR) 10 mg tablet Take 1 Tab by mouth daily.  omeprazole (PRILOSEC) 20 mg capsule take 1 capsule by mouth every morning     No current facility-administered medications for this visit. I have reviewed the nurses notes, vitals, problem list, allergy list, medical history, family, social history and medications. REVIEW OF SYMPTOMS   Pertinent positives per HPI  General: Pt denies excessive weight gain or loss. Pt is able to conduct ADL's  HEENT: Denies blurred vision, headaches, hearing loss, epistaxis and difficulty swallowing. Respiratory: Denies cough, congestion, shortness of breath, MENA, wheezing or stridor.   Cardiovascular: Denies precordial pain, palpitations, edema or PND  Gastrointestinal: Denies poor appetite, indigestion, abdominal pain or blood in stool  Genitourinary: Denies hematuria, dysuria, increased urinary frequency  Musculoskeletal: Denies joint pain or swelling from muscles or joints  Neurologic: Denies tremor, paresthesias, headache, or sensory motor disturbance  Psychiatric: Denies confusion, insomnia, depression  Integumentray: Denies rash, itching or ulcers. Hematologic: Denies easy bruising, bleeding     PHYSICAL EXAMINATION      Vitals:    06/11/21 1202   BP: 135/65   Pulse: (!) 56   Weight: 262 lb (118.8 kg)   Height: 5' 10\" (1.778 m)     General: Well developed, in no acute distress. HEENT: No jaundice, oral mucosa moist, no oral ulcers  Neck: Supple, no stiffness, no lymphadenopathy, supple  Heart:   Slow and regular  Respiratory: Clear bilaterally x 4, no wheezing or rales        Extremities:  No edema, normal cap refill, no cyanosis. Musculoskeletal: No clubbing, no deformities  Neuro: A&Ox3, speech clear, gait stable, cooperative, no focal neurologic deficits            EKG: I do not have a date on his recent EKG but showed a sinus bradycardia     DIAGNOSTIC DATA     1. Cardiac Cath   2014- stent to LAD     2. Echo   6/24/19- EF 60-65%, mild concentric LV hypertrophy, inferior wall appears mildly hypokinetic, trileaflet AV, aortic root mildly dilated, aortic root diameter 3.8 cm     3. Stress Test   5/13/21-Lexiscan/Cardiolite-no ischemia    4. Lipids  6/2/21- 6/2/21- , HDL 52, ,          LABORATORY DATA          No results found for: WBC, HGBPOC, HGB, HGBP, HCTPOC, HCT, PHCT, RBCH, PLT, MCV, HGBEXT, HCTEXT, PLTEXT, HGBEXT, HCTEXT, PLTEXT   Lab Results   Component Value Date/Time    Bilirubin, total 0.4 06/02/2021 10:45 AM    Alk. phosphatase 88 06/02/2021 10:45 AM    Protein, total 6.8 06/02/2021 10:45 AM    Albumin 4.2 06/02/2021 10:45 AM    ALT (SGPT) 21 06/02/2021 10:45 AM           ASSESSMENT/RECOMMENDATIONS:.   1 hypertension  -Blood pressure  is good today on current medical regimen no adjustments continue diet low in sodium  2 dyslipidemia  -LDL is not at goal it was 126 him to stop his Pravachol start Crestor 20 mg a day recheck his cholesterol in 2 months  3 CAD  -History of stenting of the LAD back 2014.  Now 7 years we will do a stress test last echo was in 2019 with normal EF but he does have LVH  -Stress test was normal   4. Possible JOVI  -Failed asking about sleep study      Return in 3 months       No orders of the defined types were placed in this encounter. We discussed the expected course, resolution and complications of the diagnosis(es) in detail. Medication risks, benefits, costs, interactions, and alternatives were discussed as indicated. I advised him to contact the office if his condition worsens, changes or fails to improve as anticipated. He expressed understanding with the diagnosis(es) and plan          Follow-up and Dispositions  ·   Return in about 6 months (around 12/11/2021). I have discussed the diagnosis with  Lord LyonsLucas and the intended plan as seen in the above orders. Questions were answered concerning future plans. I have discussed medication side effects and warnings with the patient as well. Thank you,  Steven Connor MD for involving me in the care of  Lord Lyon . Please do not hesitate to contact me for further questions/concerns. Clayton Davlia MD, [facility] [address]  [address] [place] [address], [address] [place] [address] Drive      [phone] / [phone] Fax

## 2021-06-11 NOTE — PATIENT INSTRUCTIONS
1) stop the Pravachol also known as pravastatin 2) begin Crestor also known as rosuvastatin 20 mg at night to lower your LDL under 70 
 
3) I would obtain coenzyme Q 10 200 mg a day over-the-counter to help with any muscle aching. 4) have your cholesterol checked again in 2 months. 5) your stress test that was done on 5/13/2021 was normal with no evidence of significant coronary artery disease. 6) today's blood pressure was 135/65 7) follow-up with me in 3 months

## 2021-06-22 ENCOUNTER — TELEPHONE (OUTPATIENT)
Dept: FAMILY MEDICINE CLINIC | Age: 71
End: 2021-06-22

## 2021-06-22 NOTE — TELEPHONE ENCOUNTER
Per from from pt wife seeking appt for in-person. Notes her  is asthmatic in has a bad cough. Pt is fully vaccinated with [employer] shot. No avail at call until 6/25/21. Pt doesn't want to wait will go to Urgent Care to be seen.

## 2021-07-28 ENCOUNTER — OFFICE VISIT (OUTPATIENT)
Dept: FAMILY MEDICINE CLINIC | Age: 71
End: 2021-07-28
Payer: MEDICARE

## 2021-07-28 VITALS
HEIGHT: 70 IN | WEIGHT: 267 LBS | OXYGEN SATURATION: 95 % | SYSTOLIC BLOOD PRESSURE: 112 MMHG | HEART RATE: 51 BPM | RESPIRATION RATE: 16 BRPM | DIASTOLIC BLOOD PRESSURE: 70 MMHG | TEMPERATURE: 98.4 F | BODY MASS INDEX: 38.22 KG/M2

## 2021-07-28 DIAGNOSIS — Z00.00 MEDICARE ANNUAL WELLNESS VISIT, SUBSEQUENT: Primary | ICD-10-CM

## 2021-07-28 DIAGNOSIS — Z71.89 ADVANCED DIRECTIVES, COUNSELING/DISCUSSION: ICD-10-CM

## 2021-07-28 DIAGNOSIS — I10 ESSENTIAL HYPERTENSION: ICD-10-CM

## 2021-07-28 DIAGNOSIS — Z78.9 FULL CODE STATUS: ICD-10-CM

## 2021-07-28 DIAGNOSIS — Z11.59 ENCOUNTER FOR HEPATITIS C SCREENING TEST FOR LOW RISK PATIENT: ICD-10-CM

## 2021-07-28 DIAGNOSIS — E66.9 OBESITY, CLASS II, BMI 35-39.9: ICD-10-CM

## 2021-07-28 DIAGNOSIS — E78.2 MIXED HYPERLIPIDEMIA: ICD-10-CM

## 2021-07-28 PROCEDURE — G8754 DIAS BP LESS 90: HCPCS | Performed by: FAMILY MEDICINE

## 2021-07-28 PROCEDURE — G8427 DOCREV CUR MEDS BY ELIG CLIN: HCPCS | Performed by: FAMILY MEDICINE

## 2021-07-28 PROCEDURE — G0439 PPPS, SUBSEQ VISIT: HCPCS | Performed by: FAMILY MEDICINE

## 2021-07-28 PROCEDURE — G8510 SCR DEP NEG, NO PLAN REQD: HCPCS | Performed by: FAMILY MEDICINE

## 2021-07-28 PROCEDURE — 3017F COLORECTAL CA SCREEN DOC REV: CPT | Performed by: FAMILY MEDICINE

## 2021-07-28 PROCEDURE — G8536 NO DOC ELDER MAL SCRN: HCPCS | Performed by: FAMILY MEDICINE

## 2021-07-28 PROCEDURE — G8417 CALC BMI ABV UP PARAM F/U: HCPCS | Performed by: FAMILY MEDICINE

## 2021-07-28 PROCEDURE — G8752 SYS BP LESS 140: HCPCS | Performed by: FAMILY MEDICINE

## 2021-07-28 PROCEDURE — 1101F PT FALLS ASSESS-DOCD LE1/YR: CPT | Performed by: FAMILY MEDICINE

## 2021-07-28 RX ORDER — PRAVASTATIN SODIUM 40 MG/1
40 TABLET ORAL
COMMUNITY
End: 2021-12-09 | Stop reason: ALTCHOICE

## 2021-07-28 NOTE — PROGRESS NOTES
This is the Subsequent Medicare Annual Wellness Exam, performed 12 months or more after the Initial AWV or the last Subsequent AWV    I have reviewed the patient's medical history in detail and updated the computerized patient record. Assessment/Plan   Education and counseling provided:  Are appropriate based on today's review and evaluation  End-of-Life planning (with patient's consent)  Colorectal cancer screening tests  Immunizations    1. Medicare annual wellness visit, subsequent  Michelle Parr. was counseled on age-appropriate/ guideline-based risk prevention behaviors and screening for a 73y.o. year old   male . We also discussed adjustments in screening based on family history if necessary. Printed instructions for preventative screening guidelines and healthy behaviors given to patient with after visit summary. Discussed recommended immunizations and colorectal cancer screening. Last Colonoscopy done 1/28/2019 by Dr. Venkat Solorzano. Polyps removed and sent to path w/o evidence of  \"Adenomatous changes or colitis. \"  Full immunization record not available. Pt wished to defer vacine at this time. 2. Encounter for hepatitis C screening test for low risk patient  -     HEPATITIS C AB; Future    3. Essential hypertension  -     CBC W/O DIFF; Future  -     METABOLIC PANEL, COMPREHENSIVE; Future  -     HEMOGLOBIN A1C WITH EAG; Future  4. Mixed hyperlipidemia  -     LIPID PANEL; Future  5. Obesity, Class II, BMI 35-39.9  -     HEMOGLOBIN A1C WITH EAG; Future  6. Full code status  -     FULL CODE  7. Advanced directives, counseling/discussion  -     FULL CODE  8.  Body mass index 38.0-38.9, adult       Depression Risk Factor Screening     3 most recent PHQ Screens 7/28/2021   Little interest or pleasure in doing things Not at all   Feeling down, depressed, irritable, or hopeless Not at all   Total Score PHQ 2 0       Alcohol Risk Screen    Do you average more than 1 drink per night or more than 7 drinks a week: No    In the past three months have you have had more than 4 drinks containing alcohol on one occasion: No        Functional Ability and Level of Safety    Hearing: The patient needs further evaluation. Deferred at tis time      Activities of Daily Living: The home contains: handrails      Ambulation: with no difficulty     Fall Risk:  Fall Risk Assessment, last 12 mths 7/28/2021   Able to walk? Yes   Fall in past 12 months? 0   Do you feel unsteady? 0   Are you worried about falling 0      Abuse Screen:  Patient is not abused       Cognitive Screening    Has your family/caregiver stated any concerns about your memory: no     Health Maintenance Due     Health Maintenance Due   Topic Date Due    COVID-19 Vaccine (1) Never done    DTaP/Tdap/Td series (1 - Tdap) Never done    Shingrix Vaccine Age 50> (1 of 2) Never done    Pneumococcal 65+ years (1 of 1 - PPSV23) Never done       Patient Care Team   Patient Care Team:  Herminia De La Cruz MD as PCP - General (Family Medicine)  Herminia De La Cruz MD as PCP - [facility]  Eric Haque MD (Cardiology)  Gifty Castillo MD (Gastroenterology)    History     Patient Active Problem List   Diagnosis Code    Coronary artery disease involving native coronary artery of native heart without angina pectoris I25.10    Essential hypertension I10    Hyperlipidemia E78.5    Gout M10.9    Gastroesophageal reflux disease K21.9    Environmental and seasonal allergies J30.89     Past Medical History:   Diagnosis Date    CAD (coronary artery disease)     Essential hypertension     Gout     Hyperlipidemia       Past Surgical History:   Procedure Laterality Date    HX CORONARY STENT PLACEMENT  2014    HX KNEE REPLACEMENT Left 2017     Current Outpatient Medications   Medication Sig Dispense Refill    pravastatin (PRAVACHOL) 40 mg tablet Take 40 mg by mouth nightly.       omeprazole (PRILOSEC) 20 mg capsule take 1 capsule by mouth every morning 90 Capsule 1    allopurinoL (ZYLOPRIM) 300 mg tablet take 1 tablet by mouth once daily 90 Tablet 1    cholecalciferol, vitamin D3, (Vitamin D3) 50 mcg (2,000 unit) tab Take 1 Tab by mouth daily. 90 Tab 3    amLODIPine (NORVASC) 10 mg tablet take 1 tablet by mouth daily for blood pressure 30 Tab 5    aspirin delayed-release 81 mg tablet Take 81 mg by mouth daily.  lisinopriL (PRINIVIL, ZESTRIL) 40 mg tablet Take 1 Tab by mouth daily. 90 Tab 0    metoprolol succinate (TOPROL-XL) 25 mg XL tablet take 1 tablet by mouth daily 90 Tab 0    montelukast (SINGULAIR) 10 mg tablet Take 1 Tab by mouth daily. 90 Tab 0    rosuvastatin (CRESTOR) 20 mg tablet Take 1 Tablet by mouth nightly.  (Patient not taking: Reported on 7/28/2021) 30 Tablet 5     No Known Allergies    Family History   Problem Relation Age of Onset    Heart Failure Mother     Stroke Father     Diabetes Father     Hypertension Sister     Heart Attack Brother     Diabetes Brother     Diabetes Son     No Known Problems Son     No Known Problems Daughter      Social History     Tobacco Use    Smoking status: Never Smoker    Smokeless tobacco: Never Used   Substance Use Topics    Alcohol use: Not Currently         Belle Coker MD

## 2021-07-28 NOTE — PROGRESS NOTES
Vivien Bae. is a 78 y.o. male    Chief Complaint   Patient presents with    Follow Up Chronic Condition       1. Have you been to the ER, urgent care clinic since your last visit? Hospitalized since your last visit? [facility] month and a half ago  2. Have you seen or consulted any other health care providers outside of the [address] [place] [address] since your last visit? Include any pap smears or colon screening.  No    Visit Vitals  /70 (BP 1 Location: Left arm, BP Patient Position: Sitting)   Pulse (!) 51   Temp 98.4 °F (36.9 °C) (Oral)   Resp 16   Ht 5' 10\" (1.778 m)   Wt 267 lb (121.1 kg)   SpO2 95%   BMI 38.31 kg/m²

## 2021-07-28 NOTE — PROGRESS NOTES
General Health Questions   -During the past 4 weeks:   -how would you rate your health in general? Good   -how often have you been bothered by feeling dizzy when standing up? Some days (1-2)   -how much have you been bothered by bodily pain? mildly   -Have you noticed any hearing difficulties? yes   -has your physical and emotional health limited your social activities with family or friends? no    Emotional Health Questions   -Do you have a history of depression, anxiety, or emotional problems? no  -Over the past 2 weeks, have you felt down, depressed or hopeless? yes  -Over the past 2 weeks, have you felt little interest or pleasure in doing things? no    Health Habits   Please describe your diet habits: eats 3 meals a day  Do you get 5 servings of fruits or vegetables daily? no  Do you exercise regularly? no    Activities of Daily Living and Functional Status   -Do you need help with eating, walking, dressing, bathing, toileting, the phone, transportation, shopping, preparing meals, housework, laundry, medications or managing money? no  -In the past four weeks, was someone available to help you if you needed and wanted help with anything? yes  -Are you confident are you that you can control and manage most of your health problems? yes  -Have you been given information to help you keep track of your medications? yes  -How often do you have trouble taking your medications as prescribed? never    Fall Risk and Home Safety   Have you fallen 2 or more times in the past year? no  Does your home have rugs in the hallways? no, Do you have grab bars in the bathrooms?  no, Does your home have handrails on the stairs? yes, Do you have adequate lighting in your home? yes  Do you have smoke detectors and check them regularly?  yes  Do you have difficulties driving a car/vehicle? no  Do you always fasten your seat belt when you are in a car? no

## 2021-07-28 NOTE — PATIENT INSTRUCTIONS
Medicare Wellness Visit, Male    The best way to live healthy is to have a lifestyle where you eat a well-balanced diet, exercise regularly, limit alcohol use, and quit all forms of tobacco/nicotine, if applicable. Regular preventive services are another way to keep healthy. Preventive services (vaccines, screening tests, monitoring & exams) can help personalize your care plan, which helps you manage your own care. Screening tests can find health problems at the earliest stages, when they are easiest to treat. [facility] follows the current, evidence-based guidelines published by the [facility] ([facility]) when recommending preventive services for our patients. Because we follow these guidelines, sometimes recommendations change over time as research supports it. (For example, a prostate screening blood test is no longer routinely recommended for men with no symptoms). Of course, you and your doctor may decide to screen more often for some diseases, based on your risk and co-morbidities (chronic disease you are already diagnosed with). Preventive services for you include:  - Medicare offers their members a free annual wellness visit, which is time for you and your primary care provider to discuss and plan for your preventive service needs. Take advantage of this benefit every year!  -All adults over age 72 should receive the recommended pneumonia vaccines. Current USPSTF guidelines recommend a series of two vaccines for the best pneumonia protection.   -All adults should have a flu vaccine yearly and tetanus vaccine every 10 years.  -All adults age 48 and older should receive the shingles vaccines (series of two vaccines).        -All adults age 38-68 who are overweight should have a diabetes screening test once every three years.   -Other screening tests & preventive services for persons with diabetes include: an eye exam to screen for diabetic retinopathy, a kidney function test, a foot exam, and stricter control over your cholesterol.   -Cardiovascular screening for adults with routine risk involves an electrocardiogram (ECG) at intervals determined by the provider.   -Colorectal cancer screening should be done for adults age 54-65 with no increased risk factors for colorectal cancer. There are a number of acceptable methods of screening for this type of cancer. Each test has its own benefits and drawbacks. Discuss with your provider what is most appropriate for you during your annual wellness visit. The different tests include: colonoscopy (considered the best screening method), a fecal occult blood test, a fecal DNA test, and sigmoidoscopy.  -All adults born between [facility] should be screened once for Hepatitis C.  -An Abdominal Aortic Aneurysm (AAA) Screening is recommended for men age 73-68 who has ever smoked in their lifetime. Here is a list of your current Health Maintenance items (your personalized list of preventive services) with a due date:  Health Maintenance Due   Topic Date Due    Hepatitis C Test  Never done    COVID-19 Vaccine (1) Never done    DTaP/Tdap/Td  (1 - Tdap) Never done    Colorectal Screening  Never done    Shingles Vaccine (1 of 2) Never done    Pneumococcal Vaccine (1 of 1 - PPSV23) Never done          Advance Care Planning: Care Instructions  Your Care Instructions  It can be hard to live with an illness that cannot be cured. But if your health is getting worse, you may want to make decisions about end-of-life care. Planning for the end of your life does not mean that you are giving up. It is a way to make sure that your wishes are met. Clearly stating your wishes can make it easier for your loved ones. Making plans while you are still able may also ease your mind and make your final days less stressful and more meaningful. Follow-up care is a key part of your treatment and safety.  Be sure to make and go to all appointments, and call your doctor if you are having problems. It's also a good idea to know your test results and keep a list of the medicines you take. What can you do to plan for the end of life? · You can bring these issues up with your doctor. You do not need to wait until your doctor starts the conversation. You might start with \"I would not be willing to live with . Judy Sida Judy Sida Judy Sida \" When you complete this sentence it helps your doctor understand your wishes. · Talk openly and honestly with your doctor. This is the best way to understand the decisions you will need to make as your health changes. Know that you can always change your mind. · Ask your doctor about commonly used life-support measures. These include tube feedings, breathing machines, and fluids given through a vein (IV). Understanding these treatments will help you decide whether you want them. · You may choose to have these life-supporting treatments for a limited time. This allows a trial period to see whether they will help you. You may also decide that you want your doctor to take only certain measures to keep you alive. It is important to spell out these conditions so that your doctor and family understand them. · Talk to your doctor about how long you are likely to live. He or she may be able to give you an idea of what usually happens with your specific illness. · Think about preparing papers that state your wishes. This way there will not be any confusion about what you want. You can change your instructions at any time. Which papers should you prepare? Advance directives are legal papers that tell doctors how you want to be cared for at the end of your life. You do not need a  to write these papers. Ask your doctor or your state health department for information on how to write your advance directives. They may have the forms for each of these types of papers.  Make sure your doctor has a copy of these on file, and give a copy to a family member or close friend. · Consider a do-not-resuscitate order (DNR). This order asks that no extra treatments be done if your heart stops or you stop breathing. Extra treatments may include cardiopulmonary resuscitation (CPR), electrical shock to restart your heart, or a machine to breathe for you. If you decide to have a DNR order, ask your doctor to explain and write it. Place the order in your home where everyone can easily see it. · Consider a living will. A living will explains your wishes about life support and other treatments at the end of your life if you become unable to speak for yourself. Living fisher tell doctors to use or not use treatments that would keep you alive. You must have one or two witnesses or a notary present when you sign this form. · Consider a durable power of  for health care. This allows you to name a person to make decisions about your care if you are not able to. Most people ask a close friend or family member. Talk to this person about the kinds of treatments you want and those that you do not want. Make sure this person understands your wishes. These legal papers are simple to change. Tell your doctor what you want to change, and ask him or her to make a note in your medical file. Give your family updated copies of the papers. Where can you learn more? Go to http://www.gray.com/. Enter P184 in the search box to learn more about \"Advance Care Planning: Care Instructions. \"  Current as of: November 17, 2016  Content Version: 11.3  © 6020-4860 [employer]. Care instructions adapted under license by [employer] (which disclaims liability or warranty for this information). If you have questions about a medical condition or this instruction, always ask your healthcare professional. [address] any warranty or liability for your use of this information.

## 2021-08-03 ENCOUNTER — LAB ONLY (OUTPATIENT)
Dept: FAMILY MEDICINE CLINIC | Age: 71
End: 2021-08-03

## 2021-08-03 DIAGNOSIS — Z11.59 ENCOUNTER FOR HEPATITIS C SCREENING TEST FOR LOW RISK PATIENT: ICD-10-CM

## 2021-08-03 DIAGNOSIS — E66.9 OBESITY, CLASS II, BMI 35-39.9: ICD-10-CM

## 2021-08-03 DIAGNOSIS — I10 ESSENTIAL HYPERTENSION: ICD-10-CM

## 2021-08-03 DIAGNOSIS — E78.2 MIXED HYPERLIPIDEMIA: ICD-10-CM

## 2021-08-03 DIAGNOSIS — Z11.59 NEED FOR HEPATITIS C SCREENING TEST: ICD-10-CM

## 2021-08-04 LAB
ALBUMIN SERPL-MCNC: 3.7 G/DL (ref 3.5–5)
ALBUMIN/GLOB SERPL: 1.2 {RATIO} (ref 1.1–2.2)
ALP SERPL-CCNC: 84 U/L (ref 45–117)
ALT SERPL-CCNC: 26 U/L (ref 12–78)
ANION GAP SERPL CALC-SCNC: 8 MMOL/L (ref 5–15)
AST SERPL-CCNC: 16 U/L (ref 15–37)
BILIRUB SERPL-MCNC: 0.5 MG/DL (ref 0.2–1)
BUN SERPL-MCNC: 27 MG/DL (ref 6–20)
BUN/CREAT SERPL: 20 (ref 12–20)
CALCIUM SERPL-MCNC: 9.1 MG/DL (ref 8.5–10.1)
CHLORIDE SERPL-SCNC: 109 MMOL/L (ref 97–108)
CHOLEST SERPL-MCNC: 189 MG/DL
CO2 SERPL-SCNC: 23 MMOL/L (ref 21–32)
CREAT SERPL-MCNC: 1.38 MG/DL (ref 0.7–1.3)
ERYTHROCYTE [DISTWIDTH] IN BLOOD BY AUTOMATED COUNT: 14.1 % (ref 11.5–14.5)
EST. AVERAGE GLUCOSE BLD GHB EST-MCNC: 117 MG/DL
GLOBULIN SER CALC-MCNC: 3.1 G/DL (ref 2–4)
GLUCOSE SERPL-MCNC: 94 MG/DL (ref 65–100)
HBA1C MFR BLD: 5.7 % (ref 4–5.6)
HCT VFR BLD AUTO: 36.6 % (ref 36.6–50.3)
HCV AB SERPL QL IA: NONREACTIVE
HCV COMMENT,HCGAC: NORMAL
HDLC SERPL-MCNC: 54 MG/DL
HDLC SERPL: 3.5 {RATIO} (ref 0–5)
HGB BLD-MCNC: 12.2 G/DL (ref 12.1–17)
LDLC SERPL CALC-MCNC: 111 MG/DL (ref 0–100)
MCH RBC QN AUTO: 32.3 PG (ref 26–34)
MCHC RBC AUTO-ENTMCNC: 33.3 G/DL (ref 30–36.5)
MCV RBC AUTO: 96.8 FL (ref 80–99)
NRBC # BLD: 0 K/UL (ref 0–0.01)
NRBC BLD-RTO: 0 PER 100 WBC
PLATELET # BLD AUTO: 210 K/UL (ref 150–400)
PMV BLD AUTO: 10.8 FL (ref 8.9–12.9)
POTASSIUM SERPL-SCNC: 4.6 MMOL/L (ref 3.5–5.1)
PROT SERPL-MCNC: 6.8 G/DL (ref 6.4–8.2)
RBC # BLD AUTO: 3.78 M/UL (ref 4.1–5.7)
SODIUM SERPL-SCNC: 140 MMOL/L (ref 136–145)
TRIGL SERPL-MCNC: 120 MG/DL (ref ?–150)
VLDLC SERPL CALC-MCNC: 24 MG/DL
WBC # BLD AUTO: 7.7 K/UL (ref 4.1–11.1)

## 2021-08-09 DIAGNOSIS — J30.89 ENVIRONMENTAL AND SEASONAL ALLERGIES: ICD-10-CM

## 2021-08-12 RX ORDER — MONTELUKAST SODIUM 10 MG/1
TABLET ORAL
Qty: 90 TABLET | Refills: 0 | Status: SHIPPED | OUTPATIENT
Start: 2021-08-12 | End: 2021-11-08

## 2021-08-21 DIAGNOSIS — I10 ESSENTIAL HYPERTENSION: ICD-10-CM

## 2021-08-21 DIAGNOSIS — I25.10 CORONARY ARTERY DISEASE INVOLVING NATIVE CORONARY ARTERY OF NATIVE HEART WITHOUT ANGINA PECTORIS: ICD-10-CM

## 2021-08-24 RX ORDER — LISINOPRIL 40 MG/1
TABLET ORAL
Qty: 90 TABLET | Refills: 1 | Status: SHIPPED | OUTPATIENT
Start: 2021-08-24 | End: 2022-03-08

## 2021-10-17 DIAGNOSIS — I25.10 CORONARY ARTERY DISEASE INVOLVING NATIVE CORONARY ARTERY OF NATIVE HEART WITHOUT ANGINA PECTORIS: ICD-10-CM

## 2021-10-17 DIAGNOSIS — I10 ESSENTIAL HYPERTENSION: ICD-10-CM

## 2021-10-22 ENCOUNTER — OFFICE VISIT (OUTPATIENT)
Dept: CARDIOLOGY CLINIC | Age: 71
End: 2021-10-22
Payer: MEDICARE

## 2021-10-22 VITALS
HEIGHT: 70 IN | OXYGEN SATURATION: 98 % | BODY MASS INDEX: 38.22 KG/M2 | SYSTOLIC BLOOD PRESSURE: 180 MMHG | WEIGHT: 267 LBS | DIASTOLIC BLOOD PRESSURE: 106 MMHG | HEART RATE: 67 BPM

## 2021-10-22 DIAGNOSIS — E66.01 SEVERE OBESITY (BMI 35.0-35.9 WITH COMORBIDITY) (HCC): ICD-10-CM

## 2021-10-22 DIAGNOSIS — I10 ESSENTIAL HYPERTENSION: Primary | ICD-10-CM

## 2021-10-22 DIAGNOSIS — E78.00 PURE HYPERCHOLESTEROLEMIA: ICD-10-CM

## 2021-10-22 DIAGNOSIS — I25.10 CORONARY ARTERY DISEASE INVOLVING NATIVE CORONARY ARTERY OF NATIVE HEART WITHOUT ANGINA PECTORIS: ICD-10-CM

## 2021-10-22 PROCEDURE — G8753 SYS BP > OR = 140: HCPCS | Performed by: SPECIALIST

## 2021-10-22 PROCEDURE — 3017F COLORECTAL CA SCREEN DOC REV: CPT | Performed by: SPECIALIST

## 2021-10-22 PROCEDURE — 99214 OFFICE O/P EST MOD 30 MIN: CPT | Performed by: SPECIALIST

## 2021-10-22 PROCEDURE — G8432 DEP SCR NOT DOC, RNG: HCPCS | Performed by: SPECIALIST

## 2021-10-22 PROCEDURE — G8536 NO DOC ELDER MAL SCRN: HCPCS | Performed by: SPECIALIST

## 2021-10-22 PROCEDURE — G8427 DOCREV CUR MEDS BY ELIG CLIN: HCPCS | Performed by: SPECIALIST

## 2021-10-22 PROCEDURE — G8755 DIAS BP > OR = 90: HCPCS | Performed by: SPECIALIST

## 2021-10-22 PROCEDURE — G8417 CALC BMI ABV UP PARAM F/U: HCPCS | Performed by: SPECIALIST

## 2021-10-22 PROCEDURE — 1101F PT FALLS ASSESS-DOCD LE1/YR: CPT | Performed by: SPECIALIST

## 2021-10-22 NOTE — PROGRESS NOTES
Tee Espinoza. is a 73 y.o. male    Visit Vitals  BP (!) 180/106 (BP 1 Location: Left upper arm, BP Patient Position: Sitting, BP Cuff Size: Adult)   Pulse 67   Ht 5' 10\" (1.778 m)   Wt 267 lb (121.1 kg)   SpO2 98%   BMI 38.31 kg/m²       Chief Complaint   Patient presents with    Hypertension    Cholesterol Problem    Coronary Artery Disease       Chest pain NO  SOB NO  Dizziness NO  Swelling NO  Recent hospital visit NO  Refills NO  COVID VACCINE STATUS YES  HAD COVID?  NO

## 2021-10-22 NOTE — LETTER
10/22/2021    Patient: Tee Espinoza. YOB: 1950   Date of Visit: 10/22/2021     [address]  [address] [facility] [address]    Dear Lars Munoz MD,      Thank you for referring Mr. Kerry Pelletier to [facility] for evaluation. My notes for this consultation are attached. If you have questions, please do not hesitate to call me. I look forward to following your patient along with you.       Sincerely,    Corry Donahue MD

## 2021-10-22 NOTE — PROGRESS NOTES
CARDIOLOGY OFFICE NOTE    Clayton Mar MD, [address], [place], [address] [facility]  Phone [phone]; Fax [phone]  Mobile [phone]   Voice Mail [phone]    LAST OFFICE VISIT : Visit date not found  Dominga Keita MD       ATTENTION:   This medical record was transcribed using an electronic medical records/speech recognition system. Although proofread, it may and can contain electronic, spelling and other errors. Corrections may be executed at a later time. Please feel free to contact us for any clarifications as needed. ICD-10-CM ICD-9-CM   1. Essential hypertension  I10 401.9   2. Severe obesity (BMI 35.0-35.9 with comorbidity) ([facility])  E66.01 278.01    Z68.35 V85.35   3. Coronary artery disease involving native coronary artery of native heart without angina pectoris  I25.10 414.01   4. Pure hypercholesterolemia  E78.00 272.0            Isreal Dean is a 73 y.o. male with  referred for history of CAD stenting LAD, dyslipidemia, hypertension, possible JOVI    . The patient denies chest pain/ shortness of breath, orthopnea, PND, LE edema, palpitations, syncope, presyncope or fatigue. Cardiac risk factors: dyslipidemia, obesity, sedentary life style, male gender, hypertension  I have personally obtained the history from the patient. HISTORY OF PRESENTING ILLNESS   From prior note  He has been followed by [facility] cardiology in [place]. Is a history of CAD status post stenting of his LAD and 2014. He is states he occasionally gets a little twinge of chest discomfort in his left upper chest but nothing similar to 2014. That was indigestion-like sensation that was relieved actually with a Pepsi or carbonated beverage. He is not very active because of knee pain so he does not walk a lot. He has continued to gain weight over the years.      Is doing well today although he went to the chiropractor this morning forgot to take his blood pressure medicines. No chest pain or shortness of breath. ACTIVE PROBLEM LIST     Patient Active Problem List    Diagnosis Date Noted    Gastroesophageal reflux disease 03/11/2021    Environmental and seasonal allergies 03/11/2021    Coronary artery disease involving native coronary artery of native heart without angina pectoris     Essential hypertension     Hyperlipidemia     Gout            PAST MEDICAL HISTORY     Past Medical History:   Diagnosis Date    CAD (coronary artery disease)     Essential hypertension     Gout     Hyperlipidemia            PAST SURGICAL HISTORY     Past Surgical History:   Procedure Laterality Date    HX CORONARY STENT PLACEMENT  2014    HX KNEE REPLACEMENT Left 2017          ALLERGIES     No Known Allergies       FAMILY HISTORY     Family History   Problem Relation Age of Onset    Heart Failure Mother     Stroke Father     Diabetes Father     Hypertension Sister     Heart Attack Brother     Diabetes Brother     Diabetes Son     No Known Problems Son     No Known Problems Daughter     negative for cardiac disease       SOCIAL HISTORY     Social History     Socioeconomic History    Marital status:      Spouse name: Jose Wray Number of children: 3    Years of education: Not on file    Highest education level: Not on file   Occupational History    Occupation: Retired - [employer]   Tobacco Use    Smoking status: Never Smoker    Smokeless tobacco: Never Used   Substance and Sexual Activity    Alcohol use: Not Currently    Drug use: Never     Social Determinants of Health     Financial Resource Strain:     Difficulty of Paying Living Expenses:    Food Insecurity:     Worried About Running Out of Food in the Last Year:     [address] in the Last Year:    Transportation Needs:     Lack of Transportation (Medical):      Lack of Transportation (Non-Medical):    Physical Activity:     Days of Exercise per Week:     Minutes of Exercise per Session:    Stress:     Feeling of Stress :    Social Connections:     Frequency of Communication with Friends and Family:     Frequency of Social Gatherings with Friends and Family:     Attends [employer] Services:     Active Member of Clubs or Organizations:     Attends Club or Organization Meetings:     Marital Status:          MEDICATIONS     Current Outpatient Medications   Medication Sig    metoprolol succinate (TOPROL-XL) 25 mg XL tablet take 1 tablet by mouth daily    lisinopriL (PRINIVIL, ZESTRIL) 40 mg tablet take 1 tablet by mouth daily    montelukast (SINGULAIR) 10 mg tablet take 1 tablet by mouth daily    pravastatin (PRAVACHOL) 40 mg tablet Take 40 mg by mouth nightly.  omeprazole (PRILOSEC) 20 mg capsule take 1 capsule by mouth every morning    allopurinoL (ZYLOPRIM) 300 mg tablet take 1 tablet by mouth once daily    rosuvastatin (CRESTOR) 20 mg tablet Take 1 Tablet by mouth nightly. (Patient not taking: Reported on 7/28/2021)    cholecalciferol, vitamin D3, (Vitamin D3) 50 mcg (2,000 unit) tab Take 1 Tab by mouth daily.  amLODIPine (NORVASC) 10 mg tablet take 1 tablet by mouth daily for blood pressure    aspirin delayed-release 81 mg tablet Take 81 mg by mouth daily. No current facility-administered medications for this visit. I have reviewed the nurses notes, vitals, problem list, allergy list, medical history, family, social history and medications. REVIEW OF SYMPTOMS   Pertinent positives per HPI  General: Pt denies excessive weight gain or loss. Pt is able to conduct ADL's  HEENT: Denies blurred vision, headaches, hearing loss, epistaxis and difficulty swallowing. Respiratory: Denies cough, congestion, shortness of breath, MENA, wheezing or stridor.   Cardiovascular: Denies precordial pain, palpitations, edema or PND  Gastrointestinal: Denies poor appetite, indigestion, abdominal pain or blood in stool  Genitourinary: Denies hematuria, dysuria, increased urinary frequency  Musculoskeletal: Denies joint pain or swelling from muscles or joints  Neurologic: Denies tremor, paresthesias, headache, or sensory motor disturbance  Psychiatric: Denies confusion, insomnia, depression  Integumentray: Denies rash, itching or ulcers. Hematologic: Denies easy bruising, bleeding     PHYSICAL EXAMINATION      There were no vitals filed for this visit. General: Well developed, in no acute distress. HEENT: No jaundice, oral mucosa moist, no oral ulcers  Neck: Supple, no stiffness, no lymphadenopathy, supple  Heart:   Slow and regular  Respiratory: Clear bilaterally x 4, no wheezing or rales        Extremities:  No edema, normal cap refill, no cyanosis. Musculoskeletal: No clubbing, no deformities  Neuro: A&Ox3, speech clear, gait stable, cooperative, no focal neurologic deficits            EKG: I do not have a date on his recent EKG but showed a sinus bradycardia     DIAGNOSTIC DATA     1. Cardiac Cath   2014- stent to LAD     2. Echo   6/24/19- EF 60-65%, mild concentric LV hypertrophy, inferior wall appears mildly hypokinetic, trileaflet AV, aortic root mildly dilated, aortic root diameter 3.8 cm     3. Stress Test   5/13/21-Lexiscan/Cardiolite-no ischemia    4.  Lipids  6/2/21- 6/2/21- , HDL 52, ,          LABORATORY DATA            Lab Results   Component Value Date/Time    WBC 7.7 08/03/2021 08:10 AM    HGB 12.2 08/03/2021 08:10 AM    HCT 36.6 08/03/2021 08:10 AM    PLATELET 538 07/85/4541 08:10 AM    MCV 96.8 08/03/2021 08:10 AM      Lab Results   Component Value Date/Time    Sodium 140 08/03/2021 08:10 AM    Potassium 4.6 08/03/2021 08:10 AM    Chloride 109 (H) 08/03/2021 08:10 AM    CO2 23 08/03/2021 08:10 AM    Anion gap 8 08/03/2021 08:10 AM    Glucose 94 08/03/2021 08:10 AM    BUN 27 (H) 08/03/2021 08:10 AM    Creatinine 1.38 (H) 08/03/2021 08:10 AM    BUN/Creatinine ratio 20 08/03/2021 08:10 AM    GFR est AA >60 08/03/2021 08:10 AM    GFR est non-AA 51 (L) 08/03/2021 08:10 AM    Calcium 9.1 08/03/2021 08:10 AM    Bilirubin, total 0.5 08/03/2021 08:10 AM    Alk. phosphatase 84 08/03/2021 08:10 AM    Protein, total 6.8 08/03/2021 08:10 AM    Albumin 3.7 08/03/2021 08:10 AM    Globulin 3.1 08/03/2021 08:10 AM    A-G Ratio 1.2 08/03/2021 08:10 AM    ALT (SGPT) 26 08/03/2021 08:10 AM           ASSESSMENT/RECOMMENDATIONS:.   1 hypertension  -Blood pressure significantly elevated today  -Spoke with his wife on the phone he had not taken his medicine the morning particularly in the afternoon  2 dyslipidemia  -LDL is not at goal it was 126 him to stop his Pravachol start Crestor 20 mg a day recheck his cholesterol in 2 months  -He has not checked his cholesterol since being on the rosuvastatin so we will recheck in in. I would have expected if you have been on it for full 2 months the LDL was a bit lower. Lab Results   Component Value Date/Time    Cholesterol, total 189 08/03/2021 08:10 AM    HDL Cholesterol 54 08/03/2021 08:10 AM    LDL, calculated 111 (H) 08/03/2021 08:10 AM    VLDL, calculated 24 08/03/2021 08:10 AM    Triglyceride 120 08/03/2021 08:10 AM    CHOL/HDL Ratio 3.5 08/03/2021 08:10 AM       3 CAD  -History of stenting of the LAD back 2014.   -Stress test was normal  4. Possible JOVI  -He states he is unclear whether or not he has sleep apnea or not      Return in 4 to 6 weeks       No orders of the defined types were placed in this encounter. We discussed the expected course, resolution and complications of the diagnosis(es) in detail. Medication risks, benefits, costs, interactions, and alternatives were discussed as indicated. I advised him to contact the office if his condition worsens, changes or fails to improve as anticipated. He expressed understanding with the diagnosis(es) and plan          Follow-up and Dispositions  ·   Return in about 6 months (around 4/22/2022).            I have discussed the diagnosis with  Patricio Stephen. and the intended plan as seen in the above orders. Questions were answered concerning future plans. I have discussed medication side effects and warnings with the patient as well. Thank you,  Jc Daigle MD for involving me in the care of  Fara Carter. . Please do not hesitate to contact me for further questions/concerns. Clayton Davila MD, [address] [place] [address] [address]      [facility], [address] [place] [address]      [phone] / [phone] Fax

## 2021-10-22 NOTE — PATIENT INSTRUCTIONS
1) blood pressure is elevated I believe because you did not take your medicines until until later on this afternoon. I would like for you to follow your blood pressures at home in about 4 to 6 weeks return for blood pressure check    2) apparently had not been on the rosuvastatin also known as Crestor for the full time. So we will check a cholesterol profile on you    3) as above follow-up with me in 4 to 6 weeks.   In regard

## 2021-10-26 RX ORDER — AMLODIPINE BESYLATE 10 MG/1
TABLET ORAL
Qty: 90 TABLET | Refills: 3 | Status: SHIPPED | OUTPATIENT
Start: 2021-10-26

## 2021-12-09 RX ORDER — ROSUVASTATIN CALCIUM 20 MG/1
20 TABLET, COATED ORAL
Qty: 90 TABLET | Refills: 3 | Status: SHIPPED | OUTPATIENT
Start: 2021-12-09

## 2022-01-26 LAB
ALBUMIN SERPL-MCNC: 3.7 G/DL (ref 3.8–4.8)
ALP SERPL-CCNC: 91 IU/L (ref 44–121)
ALT SERPL-CCNC: 15 IU/L (ref 0–44)
AST SERPL-CCNC: 13 IU/L (ref 0–40)
BILIRUB DIRECT SERPL-MCNC: <0.1 MG/DL (ref 0–0.4)
BILIRUB SERPL-MCNC: 0.2 MG/DL (ref 0–1.2)
CHOLEST SERPL-MCNC: 136 MG/DL (ref 100–199)
HDLC SERPL-MCNC: 45 MG/DL
LDLC SERPL CALC-MCNC: 75 MG/DL (ref 0–99)
PROT SERPL-MCNC: 6.5 G/DL (ref 6–8.5)
TRIGL SERPL-MCNC: 84 MG/DL (ref 0–149)
VLDLC SERPL CALC-MCNC: 16 MG/DL (ref 5–40)

## 2022-01-28 ENCOUNTER — OFFICE VISIT (OUTPATIENT)
Dept: CARDIOLOGY CLINIC | Age: 72
End: 2022-01-28
Payer: MEDICARE

## 2022-01-28 VITALS
BODY MASS INDEX: 36.65 KG/M2 | DIASTOLIC BLOOD PRESSURE: 82 MMHG | OXYGEN SATURATION: 97 % | WEIGHT: 256 LBS | HEART RATE: 49 BPM | SYSTOLIC BLOOD PRESSURE: 138 MMHG | HEIGHT: 70 IN

## 2022-01-28 DIAGNOSIS — I25.10 CORONARY ARTERY DISEASE INVOLVING NATIVE CORONARY ARTERY OF NATIVE HEART WITHOUT ANGINA PECTORIS: ICD-10-CM

## 2022-01-28 DIAGNOSIS — E78.00 PURE HYPERCHOLESTEROLEMIA: Primary | ICD-10-CM

## 2022-01-28 DIAGNOSIS — E78.00 PURE HYPERCHOLESTEROLEMIA: ICD-10-CM

## 2022-01-28 DIAGNOSIS — I10 ESSENTIAL HYPERTENSION: Primary | ICD-10-CM

## 2022-01-28 DIAGNOSIS — E66.01 SEVERE OBESITY (BMI 35.0-35.9 WITH COMORBIDITY) (HCC): ICD-10-CM

## 2022-01-28 PROCEDURE — 99214 OFFICE O/P EST MOD 30 MIN: CPT | Performed by: SPECIALIST

## 2022-01-28 PROCEDURE — G8427 DOCREV CUR MEDS BY ELIG CLIN: HCPCS | Performed by: SPECIALIST

## 2022-01-28 PROCEDURE — G8417 CALC BMI ABV UP PARAM F/U: HCPCS | Performed by: SPECIALIST

## 2022-01-28 PROCEDURE — G8432 DEP SCR NOT DOC, RNG: HCPCS | Performed by: SPECIALIST

## 2022-01-28 PROCEDURE — G8754 DIAS BP LESS 90: HCPCS | Performed by: SPECIALIST

## 2022-01-28 PROCEDURE — 1101F PT FALLS ASSESS-DOCD LE1/YR: CPT | Performed by: SPECIALIST

## 2022-01-28 PROCEDURE — G0463 HOSPITAL OUTPT CLINIC VISIT: HCPCS | Performed by: SPECIALIST

## 2022-01-28 PROCEDURE — G8752 SYS BP LESS 140: HCPCS | Performed by: SPECIALIST

## 2022-01-28 PROCEDURE — 3017F COLORECTAL CA SCREEN DOC REV: CPT | Performed by: SPECIALIST

## 2022-01-28 PROCEDURE — G8536 NO DOC ELDER MAL SCRN: HCPCS | Performed by: SPECIALIST

## 2022-01-28 NOTE — PROGRESS NOTES
CARDIOLOGY OFFICE NOTE    Clayton Aguilera MD, [address], [place], [address] [facility]  Phone [phone]; Fax [phone]  Mobile [phone]   Voice Mail [phone]      Gavino Torres MD       ATTENTION:   This medical record was transcribed using an electronic medical records/speech recognition system. Although proofread, it may and can contain electronic, spelling and other errors. Corrections may be executed at a later time. Please feel free to contact us for any clarifications as needed. ICD-10-CM ICD-9-CM   1. Essential hypertension  I10 401.9   2. Severe obesity (BMI 35.0-35.9 with comorbidity) (HCC)  E66.01 278.01    Z68.35 V85.35   3. Coronary artery disease involving native coronary artery of native heart without angina pectoris  I25.10 414.01   4. Pure hypercholesterolemia  E78.00 272.0            Fawad Mikado. is a 73 y.o. male with  referred for history of CAD stenting LAD, dyslipidemia, hypertension, possible JOVI . Cardiac risk factors: dyslipidemia, obesity, sedentary life style, male gender, hypertension  I have personally obtained the history from the patient. HISTORY OF PRESENTING ILLNESS   From prior note  He has been followed by [facility] cardiology in [place]. Is a history of CAD status post stenting of his LAD and 2014. States he is doing well overall with no chest pain or shortness of breath.    ACTIVE PROBLEM LIST     Patient Active Problem List    Diagnosis Date Noted    Gastroesophageal reflux disease 03/11/2021    Environmental and seasonal allergies 03/11/2021    Coronary artery disease involving native coronary artery of native heart without angina pectoris     Essential hypertension     Hyperlipidemia     Gout            PAST MEDICAL HISTORY     Past Medical History:   Diagnosis Date    CAD (coronary artery disease)     Essential hypertension     Gout     Hyperlipidemia            PAST SURGICAL HISTORY     Past Surgical History:   Procedure Laterality Date    HX CORONARY STENT PLACEMENT  2014    HX KNEE REPLACEMENT Left 2017          ALLERGIES     No Known Allergies       FAMILY HISTORY     Family History   Problem Relation Age of Onset    Heart Failure Mother     Stroke Father     Diabetes Father     Hypertension Sister     Heart Attack Brother     Diabetes Brother     Diabetes Son     No Known Problems Son     No Known Problems Daughter     negative for cardiac disease       SOCIAL HISTORY     Social History     Socioeconomic History    Marital status:      Spouse name: Chrissie Schmidt Number of children: 3   Occupational History    Occupation: Retired - [employer]   Tobacco Use    Smoking status: Never Smoker    Smokeless tobacco: Never Used   Substance and Sexual Activity    Alcohol use: Not Currently    Drug use: Never         MEDICATIONS     Current Outpatient Medications   Medication Sig    rosuvastatin (CRESTOR) 20 mg tablet take 1 tablet by mouth nightly    montelukast (SINGULAIR) 10 mg tablet take 1 tablet by mouth daily    amLODIPine (NORVASC) 10 mg tablet take 1 tablet by mouth once daily for blood pressure    metoprolol succinate (TOPROL-XL) 25 mg XL tablet take 1 tablet by mouth daily    lisinopriL (PRINIVIL, ZESTRIL) 40 mg tablet take 1 tablet by mouth daily    omeprazole (PRILOSEC) 20 mg capsule take 1 capsule by mouth every morning    allopurinoL (ZYLOPRIM) 300 mg tablet take 1 tablet by mouth once daily    cholecalciferol, vitamin D3, (Vitamin D3) 50 mcg (2,000 unit) tab Take 1 Tab by mouth daily.  aspirin delayed-release 81 mg tablet Take 81 mg by mouth daily. No current facility-administered medications for this visit. I have reviewed the nurses notes, vitals, problem list, allergy list, medical history, family, social history and medications.        REVIEW OF SYMPTOMS   Pertinent positives per HPI  General: Pt denies excessive weight gain or loss. Pt is able to conduct ADL's  HEENT: Denies blurred vision, headaches, hearing loss, epistaxis and difficulty swallowing. Respiratory: Denies cough, congestion, shortness of breath, MENA, wheezing or stridor. Cardiovascular: Denies precordial pain, palpitations, edema or PND  Gastrointestinal: Denies poor appetite, indigestion, abdominal pain or blood in stool  Genitourinary: Denies hematuria, dysuria, increased urinary frequency  Musculoskeletal: Denies joint pain or swelling from muscles or joints  Neurologic: Denies tremor, paresthesias, headache, or sensory motor disturbance  Psychiatric: Denies confusion, insomnia, depression  Integumentray: Denies rash, itching or ulcers. Hematologic: Denies easy bruising, bleeding     PHYSICAL EXAMINATION      Vitals:    01/28/22 0948   BP: 138/82   Pulse: (!) 49   SpO2: 97%   Weight: 256 lb (116.1 kg)   Height: 5' 10\" (1.778 m)     General: Well developed, in no acute distress. HEENT: No jaundice, oral mucosa moist, no oral ulcers  Neck: Supple, no stiffness, no lymphadenopathy, supple  Heart:   Slow and regular  Respiratory: Clear bilaterally x 4, no wheezing or rales  Extremities:  No edema, normal cap refill, no cyanosis. Musculoskeletal: No clubbing, no deformities  Neuro: A&Ox3, speech clear, gait stable, cooperative, no focal neurologic deficits            EKG: none today     DIAGNOSTIC DATA     1. Cardiac Cath   2014- stent to LAD     2. Echo   6/24/19- EF 60-65%, mild concentric LV hypertrophy, inferior wall appears mildly hypokinetic, trileaflet AV, aortic root mildly dilated, aortic root diameter 3.8 cm     3. Stress Test   5/13/21-Lexiscan/Cardiolite-no ischemia    4.  Lipids  6/2/21- 6/2/21- , HDL 52, ,   1/25/22- , HDL 45, LDL 75, TG 84         LABORATORY DATA            Lab Results   Component Value Date/Time    WBC 7.7 08/03/2021 08:10 AM    HGB 12.2 08/03/2021 08:10 AM    HCT 36.6 08/03/2021 08:10 AM    PLATELET 643 05/49/9757 08:10 AM    MCV 96.8 08/03/2021 08:10 AM      Lab Results   Component Value Date/Time    Sodium 140 08/03/2021 08:10 AM    Potassium 4.6 08/03/2021 08:10 AM    Chloride 109 (H) 08/03/2021 08:10 AM    CO2 23 08/03/2021 08:10 AM    Anion gap 8 08/03/2021 08:10 AM    Glucose 94 08/03/2021 08:10 AM    BUN 27 (H) 08/03/2021 08:10 AM    Creatinine 1.38 (H) 08/03/2021 08:10 AM    BUN/Creatinine ratio 20 08/03/2021 08:10 AM    GFR est AA >60 08/03/2021 08:10 AM    GFR est non-AA 51 (L) 08/03/2021 08:10 AM    Calcium 9.1 08/03/2021 08:10 AM    Bilirubin, total 0.2 01/25/2022 10:52 AM    Alk. phosphatase 91 01/25/2022 10:52 AM    Protein, total 6.5 01/25/2022 10:52 AM    Albumin 3.7 (L) 01/25/2022 10:52 AM    Globulin 3.1 08/03/2021 08:10 AM    A-G Ratio 1.2 08/03/2021 08:10 AM    ALT (SGPT) 15 01/25/2022 10:52 AM           ASSESSMENT/RECOMMENDATIONS:.   1 hypertension  -Blood pressure is much better today. Says that his heart rates 49 but is totally asymptomatic.  -If he has any symptoms of dizziness would suggest stopping his metoprolol. 2 dyslipidemia  -He is now on Crestor and his LDLs come down from 111-75    Lab Results   Component Value Date/Time    Cholesterol, total 136 01/25/2022 10:52 AM    HDL Cholesterol 45 01/25/2022 10:52 AM    LDL, calculated 75 01/25/2022 10:52 AM    LDL, calculated 111 (H) 08/03/2021 08:10 AM    VLDL, calculated 16 01/25/2022 10:52 AM    VLDL, calculated 24 08/03/2021 08:10 AM    Triglyceride 84 01/25/2022 10:52 AM    CHOL/HDL Ratio 3.5 08/03/2021 08:10 AM       3 CAD  -History of stenting of the LAD back 2014.   -Stress test was normal and currently is not having any chest pain or symptoms of shortness of breath    4. Possible JOVI  -will do sleep evaluation as he is experiencing early morning fatigue    5.   Obesity class II  -Reviewed with him the importance of weight reduction to reduce his risk of further heart disease      Return in 6 months       No orders of the defined types were placed in this encounter. We discussed the expected course, resolution and complications of the diagnosis(es) in detail. Medication risks, benefits, costs, interactions, and alternatives were discussed as indicated. I advised him to contact the office if his condition worsens, changes or fails to improve as anticipated. He expressed understanding with the diagnosis(es) and plan          Follow-up and Dispositions  ·   Return in about 6 months (around 7/28/2022). I have discussed the diagnosis with  Fermín Brower. and the intended plan as seen in the above orders. Questions were answered concerning future plans. I have discussed medication side effects and warnings with the patient as well. Thank you,  Sima Gregg MD for involving me in the care of  Fermín Brower. . Please do not hesitate to contact me for further questions/concerns. Clayton Davila MD, [address] [place] [address] [address]      [facility], [address] [place] [address]. Lee Stewart.      [phone] / [phone] Fax

## 2022-01-28 NOTE — LETTER
1/28/2022    Patient: Rosario Lilly. YOB: 1950   Date of Visit: 1/28/2022     [address]  [address] [facility] [address]    Dear Evelyn Faith MD,      Thank you for referring Mr. Gentry Shields to [facility] for evaluation. My notes for this consultation are attached. If you have questions, please do not hesitate to call me. I look forward to following your patient along with you.       Sincerely,    Ines Naqvi MD

## 2022-01-28 NOTE — PROGRESS NOTES
Dakotah Perez. is a 73 y.o. male    Visit Vitals  /82 (BP 1 Location: Left upper arm, BP Patient Position: Sitting, BP Cuff Size: Adult)   Pulse (!) 49   Ht 5' 10\" (1.778 m)   Wt 256 lb (116.1 kg)   SpO2 97%   BMI 36.73 kg/m²       Chief Complaint   Patient presents with    Hypertension    Cholesterol Problem    Coronary Artery Disease       Chest pain NO  SOB NO  Dizziness NO  Swelling NO  Recent hospital visit NO  Refills NO  COVID VACCINE STATUS YES  HAD COVID?  NO

## 2022-01-31 DIAGNOSIS — E66.01 SEVERE OBESITY (BMI 35.0-35.9 WITH COMORBIDITY) (HCC): ICD-10-CM

## 2022-01-31 DIAGNOSIS — I10 ESSENTIAL HYPERTENSION: Primary | ICD-10-CM

## 2022-02-22 ENCOUNTER — OFFICE VISIT (OUTPATIENT)
Dept: FAMILY MEDICINE CLINIC | Age: 72
End: 2022-02-22
Payer: MEDICARE

## 2022-02-22 VITALS
HEIGHT: 70 IN | WEIGHT: 260 LBS | DIASTOLIC BLOOD PRESSURE: 69 MMHG | SYSTOLIC BLOOD PRESSURE: 134 MMHG | BODY MASS INDEX: 37.22 KG/M2 | RESPIRATION RATE: 16 BRPM

## 2022-02-22 DIAGNOSIS — G89.29 CHRONIC RIGHT SHOULDER PAIN: ICD-10-CM

## 2022-02-22 DIAGNOSIS — K52.9 CHRONIC DIARRHEA: Primary | ICD-10-CM

## 2022-02-22 DIAGNOSIS — M25.511 CHRONIC RIGHT SHOULDER PAIN: ICD-10-CM

## 2022-02-22 PROBLEM — E66.9 OBESITY, CLASS II, BMI 35-39.9: Status: ACTIVE | Noted: 2022-02-22

## 2022-02-22 LAB
BASOPHILS # BLD: 0.1 K/UL (ref 0–0.1)
BASOPHILS NFR BLD: 1 % (ref 0–1)
CRP SERPL-MCNC: <0.29 MG/DL (ref 0–0.6)
DIFFERENTIAL METHOD BLD: ABNORMAL
EOSINOPHIL # BLD: 0.5 K/UL (ref 0–0.4)
EOSINOPHIL NFR BLD: 7 % (ref 0–7)
ERYTHROCYTE [DISTWIDTH] IN BLOOD BY AUTOMATED COUNT: 13.6 % (ref 11.5–14.5)
FERRITIN SERPL-MCNC: 181 NG/ML (ref 26–388)
HCT VFR BLD AUTO: 37.7 % (ref 36.6–50.3)
HGB BLD-MCNC: 12.2 G/DL (ref 12.1–17)
IMM GRANULOCYTES # BLD AUTO: 0 K/UL (ref 0–0.04)
IMM GRANULOCYTES NFR BLD AUTO: 0 % (ref 0–0.5)
LYMPHOCYTES # BLD: 1.9 K/UL (ref 0.8–3.5)
LYMPHOCYTES NFR BLD: 28 % (ref 12–49)
MCH RBC QN AUTO: 31.3 PG (ref 26–34)
MCHC RBC AUTO-ENTMCNC: 32.4 G/DL (ref 30–36.5)
MCV RBC AUTO: 96.7 FL (ref 80–99)
MONOCYTES # BLD: 0.5 K/UL (ref 0–1)
MONOCYTES NFR BLD: 8 % (ref 5–13)
NEUTS SEG # BLD: 3.7 K/UL (ref 1.8–8)
NEUTS SEG NFR BLD: 56 % (ref 32–75)
NRBC # BLD: 0 K/UL (ref 0–0.01)
NRBC BLD-RTO: 0 PER 100 WBC
PLATELET # BLD AUTO: 204 K/UL (ref 150–400)
PMV BLD AUTO: 11 FL (ref 8.9–12.9)
RBC # BLD AUTO: 3.9 M/UL (ref 4.1–5.7)
T4 FREE SERPL-MCNC: 1 NG/DL (ref 0.8–1.5)
TSH SERPL DL<=0.05 MIU/L-ACNC: 2.87 UIU/ML (ref 0.36–3.74)
WBC # BLD AUTO: 6.7 K/UL (ref 4.1–11.1)

## 2022-02-22 PROCEDURE — G8536 NO DOC ELDER MAL SCRN: HCPCS | Performed by: FAMILY MEDICINE

## 2022-02-22 PROCEDURE — G8752 SYS BP LESS 140: HCPCS | Performed by: FAMILY MEDICINE

## 2022-02-22 PROCEDURE — 1101F PT FALLS ASSESS-DOCD LE1/YR: CPT | Performed by: FAMILY MEDICINE

## 2022-02-22 PROCEDURE — G8754 DIAS BP LESS 90: HCPCS | Performed by: FAMILY MEDICINE

## 2022-02-22 PROCEDURE — G8432 DEP SCR NOT DOC, RNG: HCPCS | Performed by: FAMILY MEDICINE

## 2022-02-22 PROCEDURE — 99214 OFFICE O/P EST MOD 30 MIN: CPT | Performed by: FAMILY MEDICINE

## 2022-02-22 PROCEDURE — G0463 HOSPITAL OUTPT CLINIC VISIT: HCPCS | Performed by: FAMILY MEDICINE

## 2022-02-22 PROCEDURE — G8427 DOCREV CUR MEDS BY ELIG CLIN: HCPCS | Performed by: FAMILY MEDICINE

## 2022-02-22 PROCEDURE — G8417 CALC BMI ABV UP PARAM F/U: HCPCS | Performed by: FAMILY MEDICINE

## 2022-02-22 PROCEDURE — 3017F COLORECTAL CA SCREEN DOC REV: CPT | Performed by: FAMILY MEDICINE

## 2022-02-22 NOTE — PROGRESS NOTES
Carlton Fatima.  73 y.o. male  1950  [facility]  [id number]   [address]: Progress Note  Sharrell Ganser, MD       Encounter Date: 2/22/2022    Chief Complaint   Patient presents with    Diarrhea     C/o water-like diarrhea for the last 2 years. States it wakes him up at night and he has to run to the bathroom. He goes multiple times during the day. somedays are worse than others. Knows popcorn or grease makes it worse.  Shoulder Pain     also c/o shoulder pain for the last 6 months. Seen by chiropractor and had x-rays done. History of Present Illness   Carlton Pool is a 73 y.o. male who presents to clinic today for concerns of diarrhea and right should pain. Right shoulder pain: Chronic. Had been seen by chiropractor for this. Notes he had X-rays that showed cervical DDD (not available for review). He feels the problem is mostly his shoulder. Pain overal is \"aggravating\" Pain is minor right now. Diarrhea x 2 years  Patient complains of diarrhea. Onset of diarrhea was several years ago. Diarrhea is occurring between 1-6 times per day. This is intermittent , but notices it more after several days of eating out with his friends. He also notes that when he gets gassy the night before, he often wakes up with fecal urgency and diarrhea overnight. Some time is incontinent of feces. Symptoms often subside by mid morning. Buttered popcorn, greasy occasionally trigger it, as well as lactose. Patient describes diarrhea as watery. Diarrhea has been associated with abdominal pain cramping. Patient denies significant abdominal pain, fever, blood in stool, recent antibiotic use, illness in household contacts, recent camping, recent travel, unintentional weight loss. Previous visits for diarrhea: none. Evaluation to date: none. Treatment to date: OTC anti-diarrhea, pepto bismol, tums without significant relief.    Patient reports last colonoscopy was 2-3 years ago. Review of Systems   Review of Systems - Negative except as noted above  Vitals/Objective:     Vitals:    02/22/22 0953   BP: 134/69   Resp: 16   Weight: 260 lb (117.9 kg)   Height: 5' 10\" (1.778 m)     Body mass index is 37.31 kg/m². General: Patient alert and oriented and in NAD  HEENT: PER/EOMI, no conjunctival pallor or scleral icterus. No thyromegaly or cervical lymphadenopathy  Heart: Regular rate and rhythm, No murmurs, rubs or gallops. 2+ peripheral pulses  Lungs: Clear to auscultation bilaterally, no wheezing, rales or rhonchi  Abd: protuberant, +BS, non-tender, non-distended  Ext: No edema  MSK: some limitation to active ROM with extension or abduction. Internal/external rotation normal.  Mild discomfort with Hawkin's and empty can. Tender to palpation over right trapezius from occipital insertion to ~C6 and laterally. No radiculopathy. Negative Spurlings  Skin: No rashes or lesions noted on exposed skin,  Psych: Appropriate mood and affect      Assessment and Plan:   1. Chronic diarrhea  Given duration of symptoms, will get stool studies as well as bloodwork to evaluate for cause. If no infection, may consider increase in imodium dose. While last colonoscopy was just a few years ago, if testing is unrevealing, follow-up with GI will be indicated. Discussed avoidance of lactose over the next 4 weeks to see if this helps. Follow-up in 4 weeks.   - C. DIFFICILE AG & TOXIN A/B; Future  - ENTERIC BACTERIA PANEL, DNA; Future  - OVA & PARASITES, STOOL; Future  - CBC WITH AUTOMATED DIFF; Future  - TSH 3RD GENERATION; Future  - T4, FREE; Future  - CELIAC DISEASE PROFILE (REFLEX TTG, IGG); Future  - GIARDIA LAMBLIA, AG, STOOL; Future  - CALPROTECTIN, FECAL; Future  - LACTOFERRIN, FECAL; Future  - C REACTIVE PROTEIN, QT; Future  - OCCULT BLOOD IMMUNOASSAY,DIAGNOSTIC; Future  - FERRITIN; Future    2. Chronic right shoulder pain  Given home HEP.   Referral to PT if pain not improved with HEP. Would likely recommend repeat imaging if pain persists.   - REFERRAL TO PHYSICAL THERAPY      I have discussed the diagnosis with the patient and the intended plan as seen in the above orders. he has expressed understanding. The patient has received an after-visit summary and questions were answered concerning future plans. I have discussed medication side effects and warnings with the patient as well. Follow-up and Dispositions  ·   Return in about 4 weeks (around 3/22/2022) for Follow-up on Diarrhea. Electronically Signed: Lilliana Bustos MD     History   Patients past medical, surgical and family histories were reviewed and updated.     Past Medical History:   Diagnosis Date    CAD (coronary artery disease)     Essential hypertension     Gout     Hyperlipidemia      Past Surgical History:   Procedure Laterality Date    HX CORONARY STENT PLACEMENT  2014    HX KNEE REPLACEMENT Left 2017     Family History   Problem Relation Age of Onset    Heart Failure Mother     Stroke Father     Diabetes Father     Hypertension Sister     Heart Attack Brother     Diabetes Brother     Diabetes Son     No Known Problems Son     No Known Problems Daughter      Social History     Socioeconomic History    Marital status:      Spouse name: Ksaandra Arreguin Number of children: 3    Years of education: Not on file    Highest education level: Not on file   Occupational History    Occupation: Retired - [employer]   Tobacco Use    Smoking status: Never Smoker    Smokeless tobacco: Never Used   Substance and Sexual Activity    Alcohol use: Not Currently    Drug use: Never    Sexual activity: Not on file   Other Topics Concern    Not on file   Social History Narrative    Not on file     Social Determinants of Health     Financial Resource Strain:     Difficulty of Paying Living Expenses: Not on file   Food Insecurity:     Worried About [address] in the Last Year: Not on file    Ran Out of Food in the Last Year: Not on file   Transportation Needs:     Lack of Transportation (Medical): Not on file    Lack of Transportation (Non-Medical): Not on file   Physical Activity:     Days of Exercise per Week: Not on file    Minutes of Exercise per Session: Not on file   Stress:     Feeling of Stress : Not on file   Social Connections:     Frequency of Communication with Friends and Family: Not on file    Frequency of Social Gatherings with Friends and Family: Not on file    Attends Mandaen Services: Not on file    Active Member of [address] [place] [address] or Organizations: Not on file    Attends Club or Organization Meetings: Not on file    Marital Status: Not on file   Intimate Partner Violence:     Fear of Current or Ex-Partner: Not on file    Emotionally Abused: Not on file    Physically Abused: Not on file    Sexually Abused: Not on file   Housing Stability:     Unable to Pay for Housing in the Last Year: Not on file    Number of [place] in the Last Year: Not on file    Unstable Housing in the Last Year: Not on file            Current Medications/Allergies     Current Outpatient Medications   Medication Sig Dispense Refill    omeprazole (PRILOSEC) 20 mg capsule take 1 capsule by mouth every morning 90 Capsule 0    allopurinoL (ZYLOPRIM) 300 mg tablet take 1 tablet by mouth once daily 90 Tablet 0    rosuvastatin (CRESTOR) 20 mg tablet take 1 tablet by mouth nightly 90 Tablet 3    montelukast (SINGULAIR) 10 mg tablet take 1 tablet by mouth daily 90 Tablet 3    amLODIPine (NORVASC) 10 mg tablet take 1 tablet by mouth once daily for blood pressure 90 Tablet 3    metoprolol succinate (TOPROL-XL) 25 mg XL tablet take 1 tablet by mouth daily 90 Tablet 3    lisinopriL (PRINIVIL, ZESTRIL) 40 mg tablet take 1 tablet by mouth daily 90 Tablet 1    cholecalciferol, vitamin D3, (Vitamin D3) 50 mcg (2,000 unit) tab Take 1 Tab by mouth daily.  90 Tab 3    aspirin delayed-release 81 mg tablet Take 81 mg by mouth daily.        No Known Allergies

## 2022-02-22 NOTE — PATIENT INSTRUCTIONS
Shoulder Blade: Exercises  Introduction  Here are some examples of exercises for you to try. The exercises may be suggested for a condition or for rehabilitation. Start each exercise slowly. Ease off the exercises if you start to have pain. You will be told when to start these exercises and which ones will work best for you. How to do the exercises  Shoulder roll    1. Stand tall with your chin slightly tucked. Imagine that a string at the top of your head is pulling you straight up. 2. Keep your arms relaxed. All motion will be in your shoulders. 3. Shrug your shoulders up toward your ears, then up and back. [place] your shoulders down and back, like you're sliding your hands down into your back pants pockets. 4. Repeat the circles at least 2 to 4 times. 5. This exercise is also helpful anytime you want to relax. Lower neck and upper back stretch    1. With your arms about shoulder height, clasp your hands in front of you. 2. Drop your chin toward your chest.  3. Reach straight forward so you are rounding your upper back. Think about pulling your shoulder blades apart. Aurelio Latch feel a stretch across your upper back and shoulders. Hold for at least 6 seconds. 4. Repeat 2 to 4 times. Triceps stretch    1. Reach your arm straight up. 2. Keeping your elbow in place, bend your arm and reach your hand down behind your back. 3. With your other hand, apply gentle pressure to the bent elbow. Aurelio Latch feel a stretch at the back of your upper arm and shoulder. Hold about 6 seconds. 4. Repeat 2 to 4 times with each arm. Shoulder stretch    1. Relax your shoulders. 2. Raise one arm to shoulder height, and reach it across your chest.  3. Pull the arm slightly toward you with your other arm. This will help you get a gentle stretch. Hold for about 6 seconds. 4. Repeat 2 to 4 times. Shoulder blade squeeze    1. Sit or stand up tall with your arms at your sides.   2. Keep your shoulders relaxed and down, not shrugged. 3. Squeeze your shoulder blades together. Hold for 6 seconds, then relax. 4. Repeat 8 to 12 times. Straight-arm shoulder blade squeeze    1. Sit or stand tall. Relax your shoulders. 2. With palms down, hold your elastic tubing or band straight out in front of you. 3. Start with slight tension in the tubing or band, with your hands about shoulder-width apart. 4. Slowly pull straight out to the sides, squeezing your shoulder blades together. Keep your arms straight and at shoulder height. Slowly release. 5. Repeat 8 to 12 times. Rowing    1. [place] your elastic tubing or band at about waist height. Take one end in each hand. 2. Sit or stand with your feet hip-width apart. 3. Hold your arms straight in front of you. Adjust your distance to create slight tension in the tubing or band. 4. Slightly tuck your chin. Relax your shoulders. 5. Without shrugging your shoulders, pull straight back. Your elbows will pass alongside your waist.  Pull-downs    1. [place] your elastic tubing or band in the top of a closed door. Take one end in each hand. 2. Either sit or stand, depending on what is more comfortable. If you feel unsteady, sit on a chair. 3. Start with your arms up and comfortably apart, elbows straight. There should be a slight tension in the tubing or band. 4. Slightly tuck your chin, and look straight ahead. 5. Keeping your back straight, slowly pull down and back, bending your elbows. 6. Stop where your hands are level with your chin, in a \"goalpost\" position. 7. Repeat 8 to 12 times. Chest T stretch    1. Lie on your back. Raise your knees so they are bent. Plant your feet on the floor, hip-width apart. 2. Tuck your chin, and relax your shoulders. 3. Reach your arms straight out to the sides. If you don't feel a mild stretch in your shoulders and across your chest, use a foam roll or a tightly rolled blanket under your spine, from your tailbone to your head.   4. Relax in this position for at least 15 to 30 seconds while you breathe normally. Repeat 2 to 4 times. 5. As you get used to this stretch, keep adding a little more time until you are able relax in this position for 2 or 3 minutes. When you can relax for at least 2 minutes, you only need to do the exercise 1 time per session. Chest goalpost stretch    1. Lie on your back. Raise your knees so they are bent. Plant your feet on the floor, hip-width apart. 2. Tuck your chin, and relax your shoulders. 3. Reach your arms straight out to the sides. 4. Bend your arms at the elbows, with your hands pointed toward the top of your head. Your arms should make an L on either side of your head. Your palms should be facing up. 5. If you don't feel a mild stretch in your shoulders and across your chest, use a foam roll or tightly rolled blanket under your spine, from your tailbone to your head. 6. Relax in this position for at least 15 to 30 seconds while you breathe normally. Repeat 2 to 4 times. 7. Each day you do this exercise, add a little more time until you can relax in this position for 2 or 3 minutes. When you can relax for at least 2 minutes, you only need to do the exercise 1 time per session. Follow-up care is a key part of your treatment and safety. Be sure to make and go to all appointments, and call your doctor if you are having problems. It's also a good idea to know your test results and keep a list of the medicines you take. Where can you learn more? Go to http://www.gray.com/  Enter H745 in the search box to learn more about \"Shoulder Blade: Exercises. \"  Current as of: July 1, 2021               Content Version: 13.0  © 8359-1734 [employer]. Care instructions adapted under license by [employer] (which disclaims liability or warranty for this information).  If you have questions about a medical condition or this instruction, always ask your healthcare professional. [employer] disclaims any warranty or liability for your use of this information. Rotator Cuff: Exercises  Introduction  Here are some examples of exercises for you to try. The exercises may be suggested for a condition or for rehabilitation. Start each exercise slowly. Ease off the exercises if you start to have pain. You will be told when to start these exercises and which ones will work best for you. How to do the exercises  Pendulum swing    If you have pain in your back, do not do this exercise. 6. Hold on to a table or the back of a chair with your good arm. Then bend forward a little and let your sore arm hang straight down. This exercise does not use the arm muscles. Rather, use your legs and your hips to create movement that makes your arm swing freely. 7. Use the movement from your hips and legs to guide the slightly swinging arm back and forth like a pendulum (or elephant trunk). Then guide it in circles that start small (about the size of a dinner plate). Make the circles a bit larger each day, as your pain allows. 8. Do this exercise for 5 minutes, 5 to 7 times each day. 9. As you have less pain, try bending over a little farther to do this exercise. This will increase the amount of movement at your shoulder. Posterior stretching exercise    5. Hold the elbow of your injured arm with your other hand. 6. Use your hand to pull your injured arm gently up and across your body. You will feel a gentle stretch across the back of your injured shoulder. 7. Hold for at least 15 to 30 seconds. Then slowly lower your arm. 8. Repeat 2 to 4 times. Up-the-back stretch    Your doctor or physical therapist may want you to wait to do this stretch until you have regained most of your range of motion and strength. You can do this stretch in different ways. Hold any of these stretches for at least 15 to 30 seconds. Repeat them 2 to 4 times. 5. Light stretch: Put your hand in your back pocket.  Let it rest there to stretch your shoulder. 6. Moderate stretch: With your other hand, hold your injured arm (palm outward) behind your back by the wrist. Pull your arm up gently to stretch your shoulder. 7. Advanced stretch: Put a towel over your other shoulder. Put the hand of your injured arm behind your back. Now hold the back end of the towel. With the other hand, hold the front end of the towel in front of your body. Pull gently on the front end of the towel. This will bring your hand farther up your back to stretch your shoulder. Overhead stretch    5. Standing about an arm's length away, grasp onto a solid surface. You could use a countertop, a doorknob, or the back of a sturdy chair. 6. With your knees slightly bent, bend forward with your arms straight. Lower your upper body, and let your shoulders stretch. 7. As your shoulders are able to stretch farther, you may need to take a step or two backward. 8. Hold for at least 15 to 30 seconds. Then stand up and relax. If you had stepped back during your stretch, step forward so you can keep your hands on the solid surface. 9. Repeat 2 to 4 times. Shoulder flexion (lying down)    To make a wand for this exercise, use a piece of PVC pipe or a broom handle with the broom removed. Make the wand about a foot wider than your shoulders. 5. Lie on your back, holding a wand with both hands. Your palms should face down as you hold the wand. 6. Keeping your elbows straight, slowly raise your arms over your head. Raise them until you feel a stretch in your shoulders, upper back, and chest.  7. Hold for 15 to 30 seconds. 8. Repeat 2 to 4 times. Shoulder rotation (lying down)    To make a wand for this exercise, use a piece of PVC pipe or a broom handle with the broom removed. Make the wand about a foot wider than your shoulders. 6. Lie on your back. Hold a wand with both hands with your elbows bent and palms up.   7. Keep your elbows close to your body, and move the wand across your body toward the sore arm. 8. Hold for 8 to 12 seconds. 9. Repeat 2 to 4 times. Wall climbing (to the side)    Avoid any movement that is straight to your side, and be careful not to arch your back. Your arm should stay about 30 degrees to the front of your side. 6. Stand with your side to a wall so that your fingers can just touch it at an angle about 30 degrees toward the front of your body. 7. Walk the fingers of your injured arm up the wall as high as pain permits. Try not to shrug your shoulder up toward your ear as you move your arm up. 8. Hold that position for a count of at least 15 to 20.  9. Walk your fingers back down to the starting position. 10. Repeat at least 2 to 4 times. Try to reach higher each time. Wall climbing (to the front)    During this stretching exercise, be careful not to arch your back. 8. Face a wall, and stand so your fingers can just touch it. 9. Keeping your shoulder down, walk the fingers of your injured arm up the wall as high as pain permits. (Don't shrug your shoulder up toward your ear.)  10. Hold your arm in that position for at least 15 to 30 seconds. 11. Slowly walk your fingers back down to where you started. 12. Repeat at least 2 to 4 times. Try to reach higher each time. Shoulder blade squeeze    6. Stand with your arms at your sides, and squeeze your shoulder blades together. Do not raise your shoulders up as you squeeze. 7. Hold 6 seconds. 8. Repeat 8 to 12 times. Scapular exercise: Arm reach    8. Lie flat on your back. This exercise is a very slight motion that starts with your arms raised (elbows straight, arms straight). 9. From this position, reach higher toward the ce or ceiling. Keep your elbows straight. All motion should be from your shoulder blade only. 10. Relax your arms back to where you started. 11. Repeat 8 to 12 times.   Arm raise to the side    During this strengthening exercise, your arm should stay about 30 degrees to the front of your side. 1. Slowly raise your injured arm to the side, with your thumb facing up. Raise your arm 60 degrees at the most (shoulder level is 90 degrees). 2. Hold the position for 3 to 5 seconds. Then lower your arm back to your side. If you need to, bring your \"good\" arm across your body and place it under the elbow as you lower your injured arm. Use your good arm to keep your injured arm from dropping down too fast.  3. Repeat 8 to 12 times. 4. When you first start out, don't hold any extra weight in your hand. As you get stronger, you may use a 1-pound to 2-pound dumbbell or a small can of food. Shoulder flexor and extensor exercise    These are isometric exercises. That means you contract your muscles without actually moving. 1. Push forward (flex): Stand facing a wall or doorjamb, about 6 inches or less back. Hold your injured arm against your body. Make a closed fist with your thumb on top. Then gently push your hand forward into the wall with about 25% to 50% of your strength. Don't let your body move backward as you push. Hold for about 6 seconds. Relax for a few seconds. Repeat 8 to 12 times. 2. Push backward (extend): Stand with your back flat against a wall. Your upper arm should be against the wall, with your elbow bent 90 degrees (your hand straight ahead). Push your elbow gently back against the wall with about 25% to 50% of your strength. Don't let your body move forward as you push. Hold for about 6 seconds. Relax for a few seconds. Repeat 8 to 12 times. Scapular exercise: Wall push-ups    This exercise is best done with your fingers somewhat turned out, rather than straight up and down. 1. Stand facing a wall, about 12 inches to 18 inches away. 2. Place your hands on the wall at shoulder height. 3. Slowly bend your elbows and bring your face to the wall. Keep your back and hips straight. 4. Push back to where you started. 5. Repeat 8 to 12 times.   6. When you can do this exercise against a wall comfortably, you can try it against a counter. You can then slowly progress to the end of a couch, then to a sturdy chair, and finally to the floor. Scapular exercise: Retraction    For this exercise, you will need elastic exercise material, such as surgical tubing or Thera-Band. 1. Put the band around a solid object at about waist level. (A bedpost will work well.) Each hand should hold an end of the band. 2. With your elbows at your sides and bent to 90 degrees, pull the band back. Your shoulder blades should move toward each other. Then move your arms back where you started. 3. Repeat 8 to 12 times. 4. If you have good range of motion in your shoulders, try this exercise with your arms lifted out to the sides. Keep your elbows at a 90-degree angle. Raise the elastic band up to about shoulder level. Pull the band back to move your shoulder blades toward each other. Then move your arms back where you started. Internal rotator strengthening exercise    1. Start by tying a piece of elastic exercise material to a doorknob. You can use surgical tubing or Thera-Band. 2. Stand or sit with your shoulder relaxed and your elbow bent 90 degrees. Your upper arm should rest comfortably against your side. Squeeze a rolled towel between your elbow and your body for comfort. This will help keep your arm at your side. 3. Hold one end of the elastic band in the hand of the painful arm. 4. Slowly rotate your forearm toward your body until it touches your belly. Slowly move it back to where you started. 5. Keep your elbow and upper arm firmly tucked against the towel roll or at your side. 6. Repeat 8 to 12 times. External rotator strengthening exercise    1. Start by tying a piece of elastic exercise material to a doorknob. You can use surgical tubing or Thera-Band. (You may also hold one end of the band in each hand.)  2. Stand or sit with your shoulder relaxed and your elbow bent 90 degrees.  Your upper arm should rest comfortably against your side. Squeeze a rolled towel between your elbow and your body for comfort. This will help keep your arm at your side. 3. Hold one end of the elastic band with the hand of the painful arm. 4. Start with your forearm across your belly. Slowly rotate the forearm out away from your body. Keep your elbow and upper arm tucked against the towel roll or the side of your body until you begin to feel tightness in your shoulder. Slowly move your arm back to where you started. 5. Repeat 8 to 12 times. Follow-up care is a key part of your treatment and safety. Be sure to make and go to all appointments, and call your doctor if you are having problems. It's also a good idea to know your test results and keep a list of the medicines you take. Where can you learn more? Go to http://www.gray.com/  Enter J005 in the search box to learn more about \"Rotator Cuff: Exercises. \"  Current as of: July 1, 2021               Content Version: 13.0  © 2006-2021 [employer]. Care instructions adapted under license by [employer] (which disclaims liability or warranty for this information). If you have questions about a medical condition or this instruction, always ask your healthcare professional. [address] any warranty or liability for your use of this information. Healthy Upper Back: Exercises  Introduction  Here are some examples of exercises for your upper back. Start each exercise slowly. Ease off the exercise if you start to have pain. Your doctor or physical therapist will tell you when you can start these exercises and which ones will work best for you. How to do the exercises  Lower neck and upper back stretch    1. Stretch your arms out in front of your body. Clasp one hand on top of your other hand.   2. Gently reach out so that you feel your shoulder blades stretching away from each other.  3. Gently bend your head forward. 4. Hold for 15 to 30 seconds. 5. Repeat 2 to 4 times. Midback stretch    If you have knee pain, do not do this exercise. 1. Kneel on the floor, and sit back on your ankles. 2. Lean forward, place your hands on the floor, and stretch your arms out in front of you. Rest your head between your arms. 3. Gently push your chest toward the floor, reaching as far in front of you as possible. 4. Hold for 15 to 30 seconds. 5. Repeat 2 to 4 times. Shoulder rolls    1. Sit comfortably with your feet shoulder-width apart. You can also do this exercise while standing. 2. Roll your shoulders up, then back, and then down in a smooth, circular motion. 3. Repeat 2 to 4 times. Wall push-up    1. Stand against a wall with your feet about 12 to 24 inches back from the wall. If you feel any pain when you do this exercise, stand closer to the wall. 2. Place your hands on the wall slightly wider apart than your shoulders, and lean forward. 3. Gently lean your body toward the wall. Then push back to your starting position. Keep the motion smooth and controlled. 4. Repeat 8 to 12 times. Resisted shoulder blade squeeze    For this exercise, you will need elastic exercise material, such as surgical tubing or Thera-Band. 1. Sit or stand, holding the band in both hands in front of you. Keep your elbows close to your sides, bent at a 90-degree angle. Your palms should face up. 2. Squeeze your shoulder blades together, and move your arms to the outside, stretching the band. Be sure to keep your elbows at your sides while you do this. 3. Relax. 4. Repeat 8 to 12 times. Resisted rows    For this exercise, you will need elastic exercise material, such as surgical tubing or Thera-Band. 1. Put the band around a solid object, such as a bedpost, at about waist level. Hold one end of the band in each hand.   2. With your elbows at your sides and bent to 90 degrees, pull the band back to move your shoulder blades toward each other. Return to the starting position. 3. Repeat 8 to 12 times. Follow-up care is a key part of your treatment and safety. Be sure to make and go to all appointments, and call your doctor if you are having problems. It's also a good idea to know your test results and keep a list of the medicines you take. Where can you learn more? Go to http://www.gray.com/  Enter N797 in the search box to learn more about \"Healthy Upper Back: Exercises. \"  Current as of: July 1, 2021               Content Version: 13.0  © 1254-7176 [employer]. Care instructions adapted under license by [employer] (which disclaims liability or warranty for this information). If you have questions about a medical condition or this instruction, always ask your healthcare professional. [address] any warranty or liability for your use of this information. Diarrhea: Care Instructions  Your Care Instructions     Diarrhea is loose, watery stools (bowel movements). The exact cause is often hard to find. Sometimes diarrhea is your body's way of getting rid of what caused an upset stomach. Viruses, food poisoning, and many medicines can cause diarrhea. Some people get diarrhea in response to emotional stress, anxiety, or certain foods. Almost everyone has diarrhea now and then. It usually isn't serious, and your stools will return to normal soon. The important thing to do is replace the fluids you have lost, so you can prevent dehydration. The doctor has checked you carefully, but problems can develop later. If you notice any problems or new symptoms, get medical treatment right away. Follow-up care is a key part of your treatment and safety. Be sure to make and go to all appointments, and call your doctor if you are having problems. It's also a good idea to know your test results and keep a list of the medicines you take.   How can you care for yourself at home? · Watch for signs of dehydration, which means your body has lost too much water. Dehydration is a serious condition and should be treated right away. Signs of dehydration are:  ? Increasing thirst and dry eyes and mouth. ? Feeling faint or lightheaded. ? A smaller amount of urine than normal.  · To prevent dehydration, drink plenty of fluids. Choose water and other clear liquids until you feel better. If you have kidney, heart, or liver disease and have to limit fluids, talk with your doctor before you increase the amount of fluids you drink. · When you feel like eating, start with small amounts of food. · The doctor may recommend that you take over-the-counter medicine, such as loperamide (Imodium), if you still have diarrhea after 6 hours. Read and follow all instructions on the label. Do not use this medicine if you have bloody diarrhea, a high fever, or other signs of serious illness. Call your doctor if you think you are having a problem with your medicine. When should you call for help? Call 911 anytime you think you may need emergency care. For example, call if:    · You passed out (lost consciousness).     · Your stools are maroon or very bloody. Call your doctor now or seek immediate medical care if:    · You are dizzy or lightheaded, or you feel like you may faint.     · Your stools are black and look like tar, or they have streaks of blood.     · You have new or worse belly pain.     · You have symptoms of dehydration, such as:  ? Dry eyes and a dry mouth. ? Passing only a little urine. ? Feeling thirstier than usual.     · You have a new or higher fever. Watch closely for changes in your health, and be sure to contact your doctor if:    · Your diarrhea is getting worse.     · You see pus in the diarrhea.     · You are not getting better after 2 days (48 hours). Where can you learn more?   Go to http://www.gray.com/  Enter [id number] in the search box to learn more about \"Diarrhea: Care Instructions. \"  Current as of: July 1, 2021               Content Version: 13.0  © 9299-2993 Healthwise, Incorporated. Care instructions adapted under license by [employer] (which disclaims liability or warranty for this information). If you have questions about a medical condition or this instruction, always ask your healthcare professional. [employer] any warranty or liability for your use of this information.

## 2022-02-22 NOTE — PROGRESS NOTES
Chief Complaint   Patient presents with    Diarrhea     C/o water-like diarrhea for the last 2 years. States it wakes him up at night and he has to run to the bathroom. He goes multiple times during the day. somedays are worse than others. Knows popcorn or grease makes it worse.  Shoulder Pain     also c/o shoulder pain for the last 6 months. Seen by chiropractor and had x-rays done.        Visit Vitals  /69 (BP 1 Location: Right arm, BP Patient Position: Sitting, BP Cuff Size: Adult long)   Resp 16   Ht 5' 10\" (1.778 m)   Wt 260 lb (117.9 kg)   BMI 37.31 kg/m²

## 2022-02-22 NOTE — PROGRESS NOTES
Sunny Mcclellan  73 y.o. male  1950  [facility]  [id number]   [address]: Progress Note  Sil Allison MD       Encounter Date: 2/22/2022    No chief complaint on file. History of Present Illness   Sunny Mcclellan is a 73 y.o. male who presents to clinic today for ***. Diarrhea  Patient complains of diarrhea. Onset of diarrhea was {onset:46727}. Diarrhea is occurring approximately {0-10:20198} times per day. Patient describes diarrhea as {Desc; diarrhea:31894}. Diarrhea has been associated with {Symptoms:11732}. Patient denies {diarrhea denies:60676}. Previous visits for diarrhea: {seen previously:01286}. Evaluation to date: {diarrhea evaluation to date:07143}. Treatment to date: ***      Review of Systems   Review of Systems - {ros master:803647}  Vitals/Objective: There were no vitals filed for this visit. There is no height or weight on file to calculate BMI. General: Patient alert and oriented and in NAD  HEENT: PER/EOMI, no conjunctival pallor or scleral icterus. No thyromegaly or cervical lymphadenopathy  Heart: Regular rate and rhythm, No murmurs, rubs or gallops. 2+ peripheral pulses  Lungs: Clear to auscultation bilaterally, no wheezing, rales or rhonchi  Abd: +BS, non-tender, non-distended  Ext: No edema  Skin: No rashes or lesions noted on exposed skin,  Psych: Appropriate mood and affect    No results found for this or any previous visit (from the past 24 hour(s)). Assessment and Plan:   There are no diagnoses linked to this encounter. I have discussed the diagnosis with the patient and the intended plan as seen in the above orders. he has expressed understanding. The patient has received an after-visit summary and questions were answered concerning future plans. I have discussed medication side effects and warnings with the patient as well.         Electronically Signed: Sil Allison MD     History Patients past medical, surgical and family histories were reviewed and updated. Past Medical History:   Diagnosis Date    CAD (coronary artery disease)     Essential hypertension     Gout     Hyperlipidemia      Past Surgical History:   Procedure Laterality Date    HX CORONARY STENT PLACEMENT  2014    HX KNEE REPLACEMENT Left 2017     Family History   Problem Relation Age of Onset    Heart Failure Mother     Stroke Father     Diabetes Father     Hypertension Sister     Heart Attack Brother     Diabetes Brother     Diabetes Son     No Known Problems Son     No Known Problems Daughter      Social History     Socioeconomic History    Marital status:      Spouse name: Scott Porter Number of children: 3    Years of education: Not on file    Highest education level: Not on file   Occupational History    Occupation: Retired - [employer]   Tobacco Use    Smoking status: Never Smoker    Smokeless tobacco: Never Used   Substance and Sexual Activity    Alcohol use: Not Currently    Drug use: Never    Sexual activity: Not on file   Other Topics Concern    Not on file   Social History Narrative    Not on file     Social Determinants of Health     Financial Resource Strain:     Difficulty of Paying Living Expenses: Not on file   Food Insecurity:     Worried About [address] in the Last Year: Not on file    [address] in the Last Year: Not on file   Transportation Needs:     Lack of Transportation (Medical): Not on file    Lack of Transportation (Non-Medical):  Not on file   Physical Activity:     Days of Exercise per Week: Not on file    Minutes of Exercise per Session: Not on file   Stress:     Feeling of Stress : Not on file   Social Connections:     Frequency of Communication with Friends and Family: Not on file    Frequency of Social Gatherings with Friends and Family: Not on file    Attends Anabaptist Services: Not on file    Active Member of Clubs or Organizations: Not on file    Attends Club or Organization Meetings: Not on file    Marital Status: Not on file   Intimate Partner Violence:     Fear of Current or Ex-Partner: Not on file    Emotionally Abused: Not on file    Physically Abused: Not on file    Sexually Abused: Not on file   Housing Stability:     Unable to Pay for Housing in the Last Year: Not on file    Number of Rosalinemolexa in the Last Year: Not on file    Unstable Housing in the Last Year: Not on file            Current Medications/Allergies     Current Outpatient Medications   Medication Sig Dispense Refill    omeprazole (PRILOSEC) 20 mg capsule take 1 capsule by mouth every morning 90 Capsule 0    allopurinoL (ZYLOPRIM) 300 mg tablet take 1 tablet by mouth once daily 90 Tablet 0    rosuvastatin (CRESTOR) 20 mg tablet take 1 tablet by mouth nightly 90 Tablet 3    montelukast (SINGULAIR) 10 mg tablet take 1 tablet by mouth daily 90 Tablet 3    amLODIPine (NORVASC) 10 mg tablet take 1 tablet by mouth once daily for blood pressure 90 Tablet 3    metoprolol succinate (TOPROL-XL) 25 mg XL tablet take 1 tablet by mouth daily 90 Tablet 3    lisinopriL (PRINIVIL, ZESTRIL) 40 mg tablet take 1 tablet by mouth daily 90 Tablet 1    cholecalciferol, vitamin D3, (Vitamin D3) 50 mcg (2,000 unit) tab Take 1 Tab by mouth daily. 90 Tab 3    aspirin delayed-release 81 mg tablet Take 81 mg by mouth daily.        No Known Allergies

## 2022-02-23 NOTE — PROGRESS NOTES
Bloodwork without specific cause for chronic diarrhea. Eosinophils slightly elevated which may indicate allergy or parasitic infection (this is less likely). Awaiting stool studies.

## 2022-02-24 LAB
ENDOMYSIUM IGA SER QL: NEGATIVE
IGA SERPL-MCNC: 307 MG/DL (ref 61–437)
TTG IGA SER-ACNC: <2 U/ML (ref 0–3)

## 2022-02-25 PROBLEM — R73.03 PREDIABETES: Status: ACTIVE | Noted: 2022-02-25

## 2022-03-18 PROBLEM — J30.89 ENVIRONMENTAL AND SEASONAL ALLERGIES: Status: ACTIVE | Noted: 2021-03-11

## 2022-03-18 PROBLEM — K21.9 GASTROESOPHAGEAL REFLUX DISEASE: Status: ACTIVE | Noted: 2021-03-11

## 2022-03-18 PROBLEM — R73.03 PREDIABETES: Status: ACTIVE | Noted: 2022-02-25

## 2022-03-18 PROBLEM — E66.9 OBESITY, CLASS II, BMI 35-39.9: Status: ACTIVE | Noted: 2022-02-22

## 2022-04-20 LAB
C DIFF GDH STL QL: NEGATIVE
C DIFF TOX A+B STL QL IA: NEGATIVE
CAMPYLOBACTER SPECIES, DNA: NEGATIVE
ENTEROTOXIGEN E COLI, DNA: NEGATIVE
INTERPRETATION: NORMAL
P SHIGELLOIDES DNA STL QL NAA+PROBE: NEGATIVE
SALMONELLA SPECIES, DNA: NEGATIVE
SHIGA TOXIN PRODUCING, DNA: NEGATIVE
SHIGELLA SP+EIEC IPAH STL QL NAA+PROBE: NEGATIVE
VIBRIO SPECIES, DNA: NEGATIVE
Y. ENTEROCOLITICA, DNA: NEGATIVE

## 2022-04-21 LAB
G LAMBLIA AG STL QL IA: NEGATIVE
SPECIMEN SOURCE: NORMAL

## 2022-04-22 LAB — LACTOFERRIN, LCTFLT: 1.84 UG/ML(G) (ref 0–7.24)

## 2022-04-24 ENCOUNTER — PATIENT MESSAGE (OUTPATIENT)
Dept: FAMILY MEDICINE CLINIC | Age: 72
End: 2022-04-24

## 2022-04-24 DIAGNOSIS — K52.9 CHRONIC DIARRHEA: Primary | ICD-10-CM

## 2022-04-24 LAB — CALPROTECTIN STL-MCNT: 18 UG/G (ref 0–120)

## 2022-04-25 NOTE — PROGRESS NOTES
There is no clear infectious our inflammatory cause for your chronic diarrhea. The Ova and parasite test unfortunately did not return (looks as though it needed a different sample container). You Blood Count was good overall, but did show some elevation in your eosinophils. This type of infection fighting cell can be elevated in allergy and parasitic infections. While a parasite is unlikely, it will be important to rule this out and we will need a second stool sample. If that is normal, we may need to look into possible food allergies.

## 2022-05-08 RX ORDER — NICOTINE 11MG/24HR
PATCH, TRANSDERMAL 24 HOURS TRANSDERMAL
Qty: 90 CAPSULE | Refills: 1 | Status: SHIPPED | OUTPATIENT
Start: 2022-05-08

## 2022-09-02 ENCOUNTER — OFFICE VISIT (OUTPATIENT)
Dept: CARDIOLOGY CLINIC | Age: 72
End: 2022-09-02
Payer: MEDICARE

## 2022-09-02 VITALS
HEART RATE: 56 BPM | SYSTOLIC BLOOD PRESSURE: 120 MMHG | BODY MASS INDEX: 36.56 KG/M2 | WEIGHT: 255.4 LBS | HEIGHT: 70 IN | DIASTOLIC BLOOD PRESSURE: 78 MMHG | OXYGEN SATURATION: 98 %

## 2022-09-02 DIAGNOSIS — I25.10 CORONARY ARTERY DISEASE INVOLVING NATIVE CORONARY ARTERY OF NATIVE HEART WITHOUT ANGINA PECTORIS: ICD-10-CM

## 2022-09-02 DIAGNOSIS — E66.01 SEVERE OBESITY (BMI 35.0-35.9 WITH COMORBIDITY) (HCC): ICD-10-CM

## 2022-09-02 DIAGNOSIS — I10 ESSENTIAL HYPERTENSION: Primary | ICD-10-CM

## 2022-09-02 DIAGNOSIS — E78.00 PURE HYPERCHOLESTEROLEMIA: ICD-10-CM

## 2022-09-02 PROCEDURE — G8510 SCR DEP NEG, NO PLAN REQD: HCPCS | Performed by: SPECIALIST

## 2022-09-02 PROCEDURE — G8754 DIAS BP LESS 90: HCPCS | Performed by: SPECIALIST

## 2022-09-02 PROCEDURE — 99214 OFFICE O/P EST MOD 30 MIN: CPT | Performed by: SPECIALIST

## 2022-09-02 PROCEDURE — 1123F ACP DISCUSS/DSCN MKR DOCD: CPT | Performed by: SPECIALIST

## 2022-09-02 PROCEDURE — 3017F COLORECTAL CA SCREEN DOC REV: CPT | Performed by: SPECIALIST

## 2022-09-02 PROCEDURE — G8752 SYS BP LESS 140: HCPCS | Performed by: SPECIALIST

## 2022-09-02 PROCEDURE — 1101F PT FALLS ASSESS-DOCD LE1/YR: CPT | Performed by: SPECIALIST

## 2022-09-02 PROCEDURE — G0463 HOSPITAL OUTPT CLINIC VISIT: HCPCS | Performed by: SPECIALIST

## 2022-09-02 PROCEDURE — 93005 ELECTROCARDIOGRAM TRACING: CPT | Performed by: SPECIALIST

## 2022-09-02 PROCEDURE — 93010 ELECTROCARDIOGRAM REPORT: CPT | Performed by: SPECIALIST

## 2022-09-02 PROCEDURE — G8417 CALC BMI ABV UP PARAM F/U: HCPCS | Performed by: SPECIALIST

## 2022-09-02 PROCEDURE — G8427 DOCREV CUR MEDS BY ELIG CLIN: HCPCS | Performed by: SPECIALIST

## 2022-09-02 PROCEDURE — G8536 NO DOC ELDER MAL SCRN: HCPCS | Performed by: SPECIALIST

## 2022-09-02 RX ORDER — METOPROLOL SUCCINATE 25 MG/1
12.5 TABLET, EXTENDED RELEASE ORAL DAILY
Qty: 90 TABLET | Refills: 5
Start: 2022-09-02 | End: 2022-10-03

## 2022-09-02 NOTE — PROGRESS NOTES
Chief Complaint   Patient presents with    Follow-up     6month    Hypertension    Coronary Artery Disease    Cholesterol Problem    Obesity       Vitals:    09/02/22 1200   BP: 120/78   Pulse: (!) 56   Height: 5' 10\" (1.778 m)   Weight: 255 lb 6.4 oz (115.8 kg)   SpO2: 98%         Chest pain: no    SOB: gets SOB faster but able to be active. Palpitations: no    Dizziness: no    Swelling: no    Refills:       Have you been to the ER, urgent care clinic since your last visit? Hospitalized since your last visit?no     Have you sen or consulted other health care providers outside of the [address] [place] [address] since your last visit?  (Include any pap smears or colon screening.)

## 2022-09-02 NOTE — PROGRESS NOTES
CARDIOLOGY OFFICE NOTE    Clayton Alcantara MD, [address], [place], [address] [facility]  Phone [phone]; Fax [phone]  Mobile [phone]   Voice Mail [phone]      Neil Corral MD       ATTENTION:   This medical record was transcribed using an electronic medical records/speech recognition system. Although proofread, it may and can contain electronic, spelling and other errors. Corrections may be executed at a later time. Please feel free to contact us for any clarifications as needed. ICD-10-CM ICD-9-CM   1. Essential hypertension  I10 401.9   2. Coronary artery disease involving native coronary artery of native heart without angina pectoris  I25.10 414.01   3. Pure hypercholesterolemia  E78.00 272.0   4. Severe obesity (BMI 35.0-35.9 with comorbidity) [facility])  E66.01 278.01    Z68.35 V85.35            Leyda Villegas. is a 81 y.o. male with  referred for history of CAD stenting LAD, dyslipidemia, hypertension, possible JOVI . Cardiac risk factors: dyslipidemia, obesity, sedentary life style, male gender, hypertension  I have personally obtained the history from the patient. HISTORY OF PRESENTING ILLNESS   From prior note  He has been followed by [address] [place] [address] cardiology in [place]. Is a history of CAD status post stenting of his LAD and 2014. He may feel slightly more \"run down\". We did talk about the fact that his cholesterol was at goal at 75 and repeat cholesterol in 6 months. He also has aortic root diameter 3.8 cm and this needs to be reevaluated through echo. We talked about doing CT scan.   Shortness of breath faster than he did in the past.       ACTIVE PROBLEM LIST     Patient Active Problem List    Diagnosis Date Noted    Prediabetes 02/25/2022    Obesity, Class II, BMI 35-39.9 02/22/2022    Gastroesophageal reflux disease 03/11/2021    Environmental and seasonal allergies 03/11/2021    Coronary artery disease involving native coronary artery of native heart without angina pectoris     Essential hypertension     Hyperlipidemia     Gout            PAST MEDICAL HISTORY     Past Medical History:   Diagnosis Date    CAD (coronary artery disease)     Essential hypertension     Gout     Hyperlipidemia            PAST SURGICAL HISTORY     Past Surgical History:   Procedure Laterality Date    HX CORONARY STENT PLACEMENT  2014    HX KNEE REPLACEMENT Left 2017          ALLERGIES     No Known Allergies       FAMILY HISTORY     Family History   Problem Relation Age of Onset    Heart Failure Mother     Stroke Father     Diabetes Father     Hypertension Sister     Heart Attack Brother     Diabetes Brother     Diabetes Son     No Known Problems Son     No Known Problems Daughter     negative for cardiac disease       SOCIAL HISTORY     Social History     Socioeconomic History    Marital status:      Spouse name: Abdullahi Maldonado    Number of children: 3   Occupational History    Occupation: Retired - [employer]   Tobacco Use    Smoking status: Never     Passive exposure: Never    Smokeless tobacco: Never   Substance and Sexual Activity    Alcohol use: Not Currently    Drug use: Never         MEDICATIONS     Current Outpatient Medications   Medication Sig    metoprolol succinate (TOPROL-XL) 25 mg XL tablet Take 0.5 Tablets by mouth daily. omeprazole (PRILOSEC) 20 mg capsule take 1 capsule by mouth every morning    Vitamin D3 50 mcg (2,000 unit) cap capsule take 1 capsule by mouth daily    lisinopriL (PRINIVIL, ZESTRIL) 40 mg tablet take 1 tablet by mouth once daily    rosuvastatin (CRESTOR) 20 mg tablet take 1 tablet by mouth nightly    montelukast (SINGULAIR) 10 mg tablet take 1 tablet by mouth daily    amLODIPine (NORVASC) 10 mg tablet take 1 tablet by mouth once daily for blood pressure    aspirin delayed-release 81 mg tablet Take 81 mg by mouth daily.     allopurinoL (ZYLOPRIM) 300 mg tablet take 1 tablet by mouth once daily     No current facility-administered medications for this visit. I have reviewed the nurses notes, vitals, problem list, allergy list, medical history, family, social history and medications. REVIEW OF SYMPTOMS   Pertinent positives per HPI  General: Pt denies excessive weight gain or loss. Pt is able to conduct ADL's  HEENT: Denies blurred vision, headaches, hearing loss, epistaxis and difficulty swallowing. Respiratory: Denies cough, congestion, shortness of breath, MENA, wheezing or stridor. Cardiovascular: Denies precordial pain, palpitations, edema or PND  Gastrointestinal: Denies poor appetite, indigestion, abdominal pain or blood in stool  Genitourinary: Denies hematuria, dysuria, increased urinary frequency  Musculoskeletal: Denies joint pain or swelling from muscles or joints  Neurologic: Denies tremor, paresthesias, headache, or sensory motor disturbance  Psychiatric: Denies confusion, insomnia, depression  Integumentray: Denies rash, itching or ulcers. Hematologic: Denies easy bruising, bleeding     PHYSICAL EXAMINATION      Vitals:    09/02/22 1200   BP: 120/78   Pulse: (!) 56   SpO2: 98%   Weight: 255 lb 6.4 oz (115.8 kg)   Height: 5' 10\" (1.778 m)       General: Well developed, in no acute distress. HEENT: No jaundice, oral mucosa moist, no oral ulcers  Neck: Supple, no stiffness, no lymphadenopathy, supple  Heart:   Slow and regular  Respiratory: Clear bilaterally x 4, no wheezing or rales  Extremities:  No edema, normal cap refill, no cyanosis. Musculoskeletal: No clubbing, no deformities  Neuro: A&Ox3, speech clear, gait stable, cooperative, no focal neurologic deficits            EKG: (9/5/2022)-sinus bradycardia at 48 bpm     DIAGNOSTIC DATA     1. Cardiac Cath   2014- stent to LAD     2. Echo   6/24/19- EF 60-65%, mild concentric LV hypertrophy, inferior wall appears mildly hypokinetic, trileaflet AV, aortic root mildly dilated, aortic root diameter 3.8 cm     3.  Stress Test 5/13/21-Lexiscan/Cardiolite-no ischemia    4. Lipids  6/2/21- 6/2/21- , HDL 52, ,   1/25/22- , HDL 45, LDL 75, TG 84         LABORATORY DATA            Lab Results   Component Value Date/Time    WBC 6.7 02/22/2022 10:50 AM    HGB 12.2 02/22/2022 10:50 AM    HCT 37.7 02/22/2022 10:50 AM    PLATELET 477 51/65/2735 10:50 AM    MCV 96.7 02/22/2022 10:50 AM      Lab Results   Component Value Date/Time    Sodium 140 08/03/2021 08:10 AM    Potassium 4.6 08/03/2021 08:10 AM    Chloride 109 (H) 08/03/2021 08:10 AM    CO2 23 08/03/2021 08:10 AM    Anion gap 8 08/03/2021 08:10 AM    Glucose 94 08/03/2021 08:10 AM    BUN 27 (H) 08/03/2021 08:10 AM    Creatinine 1.38 (H) 08/03/2021 08:10 AM    BUN/Creatinine ratio 20 08/03/2021 08:10 AM    GFR est AA >60 08/03/2021 08:10 AM    GFR est non-AA 51 (L) 08/03/2021 08:10 AM    Calcium 9.1 08/03/2021 08:10 AM    Bilirubin, total 0.2 01/25/2022 10:52 AM    Alk. phosphatase 91 01/25/2022 10:52 AM    Protein, total 6.5 01/25/2022 10:52 AM    Albumin 3.7 (L) 01/25/2022 10:52 AM    Globulin 3.1 08/03/2021 08:10 AM    A-G Ratio 1.2 08/03/2021 08:10 AM    ALT (SGPT) 15 01/25/2022 10:52 AM           ASSESSMENT/RECOMMENDATIONS:.       1 hypertension  -Blood pressure is much better today.   Says that his heart rates 49 but is totally asymptomatic.  -If he has any symptoms of dizziness would suggest stopping his metoprolol.  -will reduce the metorpolol xl 12.5 mg and he will keep eye on BP and and HR    2 dyslipidemia  -Last LDL was 75 currently takes 20 mg of Crestor    Lab Results   Component Value Date/Time    Cholesterol, total 136 01/25/2022 10:52 AM    HDL Cholesterol 45 01/25/2022 10:52 AM    LDL, calculated 75 01/25/2022 10:52 AM    LDL, calculated 111 (H) 08/03/2021 08:10 AM    VLDL, calculated 16 01/25/2022 10:52 AM    VLDL, calculated 24 08/03/2021 08:10 AM    Triglyceride 84 01/25/2022 10:52 AM    CHOL/HDL Ratio 3.5 08/03/2021 08:10 AM       3 CAD/shortness of breath  -History of stenting of the LAD back 2014.   -Last stress test was in May 2021  -No symptoms of angina or anginal equivalent so no cardiac testing is indicated  -Recheck echocardiogram    4. Possible JOVI  -History of using CPAP in the past  -Referral to sleep apnea clinic    5. Obesity class II  -BMI is 37  -Reviewed with him the importance of weight reduction to reduce his risk of further heart disease      Return in 6-8 weeks         Orders Placed This Encounter    LIPID PANEL     Standing Status:   Future     Standing Expiration Date:   9/2/2023    HEPATIC FUNCTION PANEL     Standing Status:   Future     Standing Expiration Date:   9/2/2023    AMB POC EKG ROUTINE W/ 12 LEADS, INTER & REP     Order Specific Question:   Reason for Exam:     Answer:   htn    metoprolol succinate (TOPROL-XL) 25 mg XL tablet     Sig: Take 0.5 Tablets by mouth daily. Dispense:  90 Tablet     Refill:  5         We discussed the expected course, resolution and complications of the diagnosis(es) in detail. Medication risks, benefits, costs, interactions, and alternatives were discussed as indicated. I advised him to contact the office if his condition worsens, changes or fails to improve as anticipated. He expressed understanding with the diagnosis(es) and plan          Follow-up and Dispositions    Return in about 6 months (around 3/2/2023). I have discussed the diagnosis with  Israel Flores. and the intended plan as seen in the above orders. Questions were answered concerning future plans. I have discussed medication side effects and warnings with the patient as well. Thank you,  Shayla Chiu MD for involving me in the care of  Israel Flores. . Please do not hesitate to contact me for further questions/concerns. Clayton Davila MD, [address] [place] [address] Rd[address]      [address] [facility], [address]     [place], [place] [address]      [phone] / [phone] Fax

## 2022-09-02 NOTE — LETTER
9/2/2022    Patient: Félix Cardoza. YOB: 1950   Date of Visit: 9/2/2022     [address] [facility] [address]    Dear Remington Serna MD,      Thank you for referring Mr. Razia Barron to [facility] for evaluation. My notes for this consultation are attached. If you have questions, please do not hesitate to call me. I look forward to following your patient along with you.       Sincerely,    Selma Bowser MD

## 2022-09-02 NOTE — PATIENT INSTRUCTIONS

## 2022-09-09 ENCOUNTER — PATIENT MESSAGE (OUTPATIENT)
Dept: SLEEP MEDICINE | Age: 72
End: 2022-09-09

## 2022-09-30 DIAGNOSIS — I25.10 CORONARY ARTERY DISEASE INVOLVING NATIVE CORONARY ARTERY OF NATIVE HEART WITHOUT ANGINA PECTORIS: ICD-10-CM

## 2022-09-30 DIAGNOSIS — I10 ESSENTIAL HYPERTENSION: ICD-10-CM

## 2022-10-03 RX ORDER — METOPROLOL SUCCINATE 25 MG/1
TABLET, EXTENDED RELEASE ORAL
Qty: 90 TABLET | Refills: 5 | Status: SHIPPED | OUTPATIENT
Start: 2022-10-03 | End: 2022-10-03 | Stop reason: DRUGHIGH

## 2022-10-03 RX ORDER — METOPROLOL SUCCINATE 25 MG/1
12.5 TABLET, EXTENDED RELEASE ORAL DAILY
Qty: 90 TABLET | Refills: 2 | Status: SHIPPED | OUTPATIENT
Start: 2022-10-03

## 2022-10-06 ENCOUNTER — ANCILLARY PROCEDURE (OUTPATIENT)
Dept: CARDIOLOGY CLINIC | Age: 72
End: 2022-10-06
Payer: MEDICARE

## 2022-10-06 VITALS
BODY MASS INDEX: 36.51 KG/M2 | WEIGHT: 255 LBS | SYSTOLIC BLOOD PRESSURE: 130 MMHG | HEIGHT: 70 IN | DIASTOLIC BLOOD PRESSURE: 68 MMHG

## 2022-10-06 DIAGNOSIS — I10 ESSENTIAL HYPERTENSION: ICD-10-CM

## 2022-10-06 DIAGNOSIS — E66.01 SEVERE OBESITY (BMI 35.0-35.9 WITH COMORBIDITY) (HCC): ICD-10-CM

## 2022-10-06 DIAGNOSIS — I25.10 CORONARY ARTERY DISEASE INVOLVING NATIVE CORONARY ARTERY OF NATIVE HEART WITHOUT ANGINA PECTORIS: ICD-10-CM

## 2022-10-06 DIAGNOSIS — E78.00 PURE HYPERCHOLESTEROLEMIA: ICD-10-CM

## 2022-10-06 PROCEDURE — 93306 TTE W/DOPPLER COMPLETE: CPT | Performed by: SPECIALIST

## 2022-10-09 LAB
ECHO AO ASC DIAM: 3.6 CM
ECHO AO ASCENDING AORTA INDEX: 1.56 CM/M2
ECHO AO ROOT DIAM: 4 CM
ECHO AO ROOT INDEX: 1.73 CM/M2
ECHO AV AREA PEAK VELOCITY: 3 CM2
ECHO AV AREA VTI: 3.4 CM2
ECHO AV AREA/BSA PEAK VELOCITY: 1.3 CM2/M2
ECHO AV AREA/BSA VTI: 1.5 CM2/M2
ECHO AV MEAN GRADIENT: 5 MMHG
ECHO AV MEAN VELOCITY: 1.1 M/S
ECHO AV PEAK GRADIENT: 11 MMHG
ECHO AV PEAK VELOCITY: 1.6 M/S
ECHO AV VELOCITY RATIO: 0.69
ECHO AV VTI: 25.9 CM
ECHO EST RA PRESSURE: 3 MMHG
ECHO LA DIAMETER INDEX: 1.99 CM/M2
ECHO LA DIAMETER: 4.6 CM
ECHO LA TO AORTIC ROOT RATIO: 1.15
ECHO LA VOL 2C: 56 ML (ref 18–58)
ECHO LA VOL 4C: 65 ML (ref 18–58)
ECHO LA VOLUME AREA LENGTH: 66 ML
ECHO LA VOLUME INDEX A2C: 24 ML/M2 (ref 16–34)
ECHO LA VOLUME INDEX A4C: 28 ML/M2 (ref 16–34)
ECHO LA VOLUME INDEX AREA LENGTH: 29 ML/M2 (ref 16–34)
ECHO LV E' LATERAL VELOCITY: 8 CM/S
ECHO LV E' SEPTAL VELOCITY: 6 CM/S
ECHO LV EDV A2C: 120 ML
ECHO LV EDV A4C: 133 ML
ECHO LV EDV BP: 131 ML (ref 67–155)
ECHO LV EDV INDEX A4C: 58 ML/M2
ECHO LV EDV INDEX BP: 57 ML/M2
ECHO LV EDV NDEX A2C: 52 ML/M2
ECHO LV EJECTION FRACTION A2C: 58 %
ECHO LV EJECTION FRACTION A4C: 58 %
ECHO LV EJECTION FRACTION BIPLANE: 59 % (ref 55–100)
ECHO LV ESV A2C: 50 ML
ECHO LV ESV A4C: 55 ML
ECHO LV ESV BP: 53 ML (ref 22–58)
ECHO LV ESV INDEX A2C: 22 ML/M2
ECHO LV ESV INDEX A4C: 24 ML/M2
ECHO LV ESV INDEX BP: 23 ML/M2
ECHO LV FRACTIONAL SHORTENING: 31 % (ref 28–44)
ECHO LV INTERNAL DIMENSION DIASTOLE INDEX: 2.21 CM/M2
ECHO LV INTERNAL DIMENSION DIASTOLIC: 5.1 CM (ref 4.2–5.9)
ECHO LV INTERNAL DIMENSION SYSTOLIC INDEX: 1.52 CM/M2
ECHO LV INTERNAL DIMENSION SYSTOLIC: 3.5 CM
ECHO LV IVSD: 1.1 CM (ref 0.6–1)
ECHO LV MASS 2D: 213.9 G (ref 88–224)
ECHO LV MASS INDEX 2D: 92.6 G/M2 (ref 49–115)
ECHO LV POSTERIOR WALL DIASTOLIC: 1.1 CM (ref 0.6–1)
ECHO LV RELATIVE WALL THICKNESS RATIO: 0.43
ECHO LVOT AREA: 4.2 CM2
ECHO LVOT AV VTI INDEX: 0.8
ECHO LVOT DIAM: 2.3 CM
ECHO LVOT MEAN GRADIENT: 3 MMHG
ECHO LVOT PEAK GRADIENT: 5 MMHG
ECHO LVOT PEAK VELOCITY: 1.1 M/S
ECHO LVOT STROKE VOLUME INDEX: 37.2 ML/M2
ECHO LVOT SV: 86 ML
ECHO LVOT VTI: 20.7 CM
ECHO MV A VELOCITY: 0.62 M/S
ECHO MV AREA PHT: 3.8 CM2
ECHO MV AREA VTI: 4 CM2
ECHO MV E DECELERATION TIME (DT): 200.8 MS
ECHO MV E VELOCITY: 0.53 M/S
ECHO MV E/A RATIO: 0.85
ECHO MV E/E' LATERAL: 6.63
ECHO MV E/E' RATIO (AVERAGED): 7.73
ECHO MV E/E' SEPTAL: 8.83
ECHO MV LVOT VTI INDEX: 1.03
ECHO MV MAX VELOCITY: 0.7 M/S
ECHO MV MEAN GRADIENT: 1 MMHG
ECHO MV MEAN VELOCITY: 0.3 M/S
ECHO MV PEAK GRADIENT: 2 MMHG
ECHO MV PRESSURE HALF TIME (PHT): 58.2 MS
ECHO MV VTI: 21.3 CM
ECHO RV INTERNAL DIMENSION: 3.9 CM
ECHO RV TAPSE: 2.4 CM (ref 1.7–?)

## 2022-10-09 PROCEDURE — 93306 TTE W/DOPPLER COMPLETE: CPT | Performed by: SPECIALIST

## 2022-10-10 NOTE — PROGRESS NOTES
Your echocardiogram reveals normal heart function and this is great news.      All the best,    Arsenio Jhaveri

## 2022-10-18 ENCOUNTER — OFFICE VISIT (OUTPATIENT)
Dept: SLEEP MEDICINE | Age: 72
End: 2022-10-18
Payer: MEDICARE

## 2022-10-18 VITALS
HEART RATE: 59 BPM | BODY MASS INDEX: 37.08 KG/M2 | HEIGHT: 70 IN | DIASTOLIC BLOOD PRESSURE: 67 MMHG | WEIGHT: 259 LBS | OXYGEN SATURATION: 95 % | SYSTOLIC BLOOD PRESSURE: 124 MMHG

## 2022-10-18 DIAGNOSIS — E66.01 SEVERE OBESITY (BMI 35.0-35.9 WITH COMORBIDITY) (HCC): ICD-10-CM

## 2022-10-18 DIAGNOSIS — G47.33 OSA (OBSTRUCTIVE SLEEP APNEA): Primary | ICD-10-CM

## 2022-10-18 PROCEDURE — 1101F PT FALLS ASSESS-DOCD LE1/YR: CPT | Performed by: SPECIALIST

## 2022-10-18 PROCEDURE — G8417 CALC BMI ABV UP PARAM F/U: HCPCS | Performed by: SPECIALIST

## 2022-10-18 PROCEDURE — G8536 NO DOC ELDER MAL SCRN: HCPCS | Performed by: SPECIALIST

## 2022-10-18 PROCEDURE — G8752 SYS BP LESS 140: HCPCS | Performed by: SPECIALIST

## 2022-10-18 PROCEDURE — G8754 DIAS BP LESS 90: HCPCS | Performed by: SPECIALIST

## 2022-10-18 PROCEDURE — G8427 DOCREV CUR MEDS BY ELIG CLIN: HCPCS | Performed by: SPECIALIST

## 2022-10-18 PROCEDURE — G8432 DEP SCR NOT DOC, RNG: HCPCS | Performed by: SPECIALIST

## 2022-10-18 PROCEDURE — 1123F ACP DISCUSS/DSCN MKR DOCD: CPT | Performed by: SPECIALIST

## 2022-10-18 PROCEDURE — 3017F COLORECTAL CA SCREEN DOC REV: CPT | Performed by: SPECIALIST

## 2022-10-18 PROCEDURE — 99204 OFFICE O/P NEW MOD 45 MIN: CPT | Performed by: SPECIALIST

## 2022-10-18 NOTE — PROGRESS NOTES
[address] [facility] [place]. [place], [address]  Tel.  [phone]  Fax. [address] [facility] [address]  [place], [address] [facility]  Tel.  [phone]  Fax. [phone] [address] [facility] [address] [place]   Tel.  [phone]  Fax. [phone]       Chief Complaint       Chief Complaint   Patient presents with    Sleep Problem     Np refd for sleep consult       HPI      Fawad Dominguez. is 81 y.o. male seen for evaluation of a sleep disorder. Notes history of nocturnal awakening. Normally retires 11 PM and will get a bed at 8: 30 AM.  May awaken several times from sleep. Notes frequent dreams. Has been told of apparent bruxism, waking with a gasp or snort, apparent apnea. And previously used Ambien; currently over-the-counter sleep aids. May awaken frequently with headache or is \"groggy\". The patient has not undergone diagnostic testing for the current problems. [place] Sleepiness Score: 3       No Known Allergies    Current Outpatient Medications   Medication Sig Dispense Refill    metoprolol succinate (TOPROL-XL) 25 mg XL tablet Take 0.5 Tablets by mouth daily. 90 Tablet 2    omeprazole (PRILOSEC) 20 mg capsule take 1 capsule by mouth every morning 90 Capsule 3    allopurinoL (ZYLOPRIM) 300 mg tablet take 1 tablet by mouth once daily 90 Tablet 3    Vitamin D3 50 mcg (2,000 unit) cap capsule take 1 capsule by mouth daily 90 Capsule 1    lisinopriL (PRINIVIL, ZESTRIL) 40 mg tablet take 1 tablet by mouth once daily 90 Tablet 4    rosuvastatin (CRESTOR) 20 mg tablet take 1 tablet by mouth nightly 90 Tablet 3    montelukast (SINGULAIR) 10 mg tablet take 1 tablet by mouth daily 90 Tablet 3    amLODIPine (NORVASC) 10 mg tablet take 1 tablet by mouth once daily for blood pressure 90 Tablet 3    aspirin delayed-release 81 mg tablet Take 81 mg by mouth daily.           He  has a past medical history of CAD (coronary artery disease), Essential hypertension, Gout, and Hyperlipidemia. He  has a past surgical history that includes hx knee replacement (Left, 2017) and hx coronary stent placement (2014). He family history includes Diabetes in his brother, father, and son; Heart Attack in his brother; Heart Failure in his mother; Hypertension in his sister; No Known Problems in his daughter and son; Stroke in his father. He  reports that he has never smoked. He has never been exposed to tobacco smoke. He has never used smokeless tobacco. He reports that he does not currently use alcohol. He reports that he does not use drugs. Review of Systems:  Review of Systems   HENT:  Positive for hearing loss. Negative for tinnitus. Eyes:  Negative for double vision. Respiratory:  Positive for shortness of breath. Negative for cough and wheezing. Cardiovascular:  Negative for chest pain and palpitations. Gastrointestinal:  Positive for heartburn. Negative for abdominal pain. Genitourinary:  Positive for frequency and urgency. Musculoskeletal:  Positive for back pain and neck pain. Neurological:  Negative for dizziness and headaches. Psychiatric/Behavioral:  The patient has insomnia. Objective:   Visit Vitals  /67 (BP 1 Location: Left upper arm, BP Patient Position: Sitting)   Pulse (!) 59   Ht 5' 10\" (1.778 m)   Wt 259 lb (117.5 kg)   SpO2 95%   BMI 37.16 kg/m²     Body mass index is 37.16 kg/m². General:   Conversant, cooperative   Eyes:  Pupils equal and reactive, no nystagmus   Oropharynx:   Mallampati score II, tongue normal, narrow posterior oral airway       Neck:   No carotid bruits; Neck circ. in \"inches\": 19   Chest/Lungs:  Clear on auscultation    CVS:  Normal rate, regular rhythm, 1+ distal edema   Skin:  Warm to touch; no obvious rashes   Neuro:  Speech fluent, face symmetrical, tongue movement normal   Psych:  Normal affect,  normal countenance        Assessment:       ICD-10-CM ICD-9-CM    1.  JOVI (obstructive sleep apnea)  G47.33 327.23 SPLIT CPAP/PSG      2. Severe obesity (BMI 35.0-35.9 with comorbidity) ([facility])  E66.01 278.01     Z68.35 V85.35         Potential sleep disordered breathing. Patient notes frequent dreams, sleep breathing abnormalities may be more prominent when supine, and/or in rem sleep. Patient would benefit from weight reduction. Was advised that weight loss measures often less effective if sleep disordered breathing not concurrently treated. Plan:     Orders Placed This Encounter    SPLIT CPAP/PSG     Standing Status:   Future     Standing Expiration Date:   4/20/2023     Order Specific Question:   Reason for Exam     Answer:   snoring       * Patient has a history and examination consistent with the diagnosis of sleep apnea. * Sleep testing was ordered for initial evaluation. * He was provided information on sleep apnea including corresponding risk factors and the importance of proper treatment. * Treatment options if indicated were reviewed today. Instructions: The patient would benefit from weight reduction measures. Do not engage in activities requiring a normal degree of alertness if fatigue is present. The patient understands that untreated or undertreated sleep apnea could impair judgement and the ability to function normally during the day. Call or return if symptoms worsen or persist.          Ankit Schmidt MD, FAA  Electronically signed 10/18/22       This note was created using voice recognition software. Despite editing, there may be syntax errors. This note will not be viewable in [address]

## 2022-10-20 ENCOUNTER — TELEPHONE (OUTPATIENT)
Dept: CARDIOLOGY CLINIC | Age: 72
End: 2022-10-20

## 2022-10-20 NOTE — TELEPHONE ENCOUNTER
Your echocardiogram reveals normal heart function and this is great news. All the best,       Sent pt my chart msg. Req to call back is have any ques. Thanks. Otc Regimen: Begin the following treatment(s): From [facility] get Cuccio Natural honey and milk oil for cuticles using it in the area daily Detail Level: Detailed

## 2022-10-26 DIAGNOSIS — I25.10 CORONARY ARTERY DISEASE INVOLVING NATIVE CORONARY ARTERY OF NATIVE HEART WITHOUT ANGINA PECTORIS: ICD-10-CM

## 2022-10-26 DIAGNOSIS — E78.00 PURE HYPERCHOLESTEROLEMIA: Primary | ICD-10-CM

## 2022-10-26 LAB
ALBUMIN SERPL-MCNC: 4.2 G/DL (ref 3.7–4.7)
ALP SERPL-CCNC: 89 IU/L (ref 44–121)
ALT SERPL-CCNC: 16 IU/L (ref 0–44)
AST SERPL-CCNC: 15 IU/L (ref 0–40)
BILIRUB DIRECT SERPL-MCNC: <0.1 MG/DL (ref 0–0.4)
BILIRUB SERPL-MCNC: 0.3 MG/DL (ref 0–1.2)
CHOLEST SERPL-MCNC: 157 MG/DL (ref 100–199)
HDLC SERPL-MCNC: 51 MG/DL
LDLC SERPL CALC-MCNC: 90 MG/DL (ref 0–99)
PROT SERPL-MCNC: 6.7 G/DL (ref 6–8.5)
TRIGL SERPL-MCNC: 85 MG/DL (ref 0–149)
VLDLC SERPL CALC-MCNC: 16 MG/DL (ref 5–40)

## 2022-10-26 NOTE — PROGRESS NOTES
Your cholesterol numbers are not at goal. To provide you with the best heart health the LDL should be under 100 if you have no heart disease or history of diabetes. If you have a history of diabetes or heart disease the LDL goal should be less than 70. I think we should increase your Crestor to 40 mg a day and repeat labs in 2 mo.     Take care and all the best,    Major Revering

## 2022-11-10 ENCOUNTER — HOSPITAL ENCOUNTER (OUTPATIENT)
Dept: SLEEP MEDICINE | Age: 72
Discharge: HOME | End: 2022-11-10
Payer: MEDICARE

## 2022-11-10 VITALS
OXYGEN SATURATION: 96 % | BODY MASS INDEX: 37.08 KG/M2 | HEIGHT: 70 IN | TEMPERATURE: 97.8 F | DIASTOLIC BLOOD PRESSURE: 69 MMHG | SYSTOLIC BLOOD PRESSURE: 139 MMHG | HEART RATE: 49 BPM | WEIGHT: 259 LBS

## 2022-11-10 DIAGNOSIS — G47.33 OSA (OBSTRUCTIVE SLEEP APNEA): ICD-10-CM

## 2022-11-10 PROCEDURE — 95810 POLYSOM 6/> YRS 4/> PARAM: CPT | Performed by: SPECIALIST

## 2022-11-11 ENCOUNTER — DOCUMENTATION ONLY (OUTPATIENT)
Dept: SLEEP MEDICINE | Age: 72
End: 2022-11-11

## 2022-11-11 NOTE — PROGRESS NOTES
[address] [place]. [place], [address]  Tel.  [phone]  Fax. [address] [facility] [address]  [place], [address] [facility]  Tel.  [phone]  Fax. [phone] [address] [facility] [address] [place]  Tel.  [phone]  Fax. [phone]     Sleep Study Technical Notes        PRE-Test:  Sushil Driver (: 1950) arrived in the lobby. Patient was greeted and screening questions asked. The patient was taken to the Sleep Center and taken directly to his/her room. Vitals:  Temperature (97.8)   BP (139/69)   SaO2 (96%)   Weight per patient (259)  Procedure explained to the patient and questions were answered. The patient expressed understanding of the procedure. Electrodes were applied without incident. The patient was placed in bed and the study was started. PAP mask acclimation for **min. Patient didn't tolerate mask. Sleep aid taken:  N      Acquisition Notes:  Lights off: 10:32pm    Respiratory events: marisabel, hypopnea, central  ECG:  nsr (bradycardia)  Snoring: Moderate snore  PAP titration:   Eliminated events:  Reduced events:  Events at  final pressure n/a  Desensitization Mask(s) Used: n/a  Positional therapy with: Other comments: PT slept well, apnea and snore noted mostly while in REM sleep. Patient had caregiver in attendance:  N  Patient watched TV or on phone after lights out for ** hours  Patient slept with positional therapy:  Y used  2 pillows/  Patient slept with head of bed elevated:  N  Patient wore an oral appliance:  N  Patient to bathroom 0 times  Pediatric Patient:  Parent accompanied patient: N  Parent slept in bed with patient: N      POST Test:  Patient was awakened. Electrodes were removed. The patient was discharged after answering the Post Questionnaire. Patient stated that he was alert and ok to drive. Equipment and room cleaned per infection control policy.

## 2022-11-13 NOTE — PATIENT INSTRUCTIONS
1) increase crestor (rosuvastatin) to 40 mg  2) repeat labs in 2 mo at [facility]  3) see me in 6 mo    It is  my pleasure to have the opportunity to be involved in taking care of you and to provide you the best cardiac care. At the end of every visit I  encourage you to eat healthy and clean and reduce your red meat intake as well as exercise 30 minutes 5 days a week to ensure a healthy heart. If you are a smoker , it will be essential that you stop smoking to reduce your risk of heart disease. Part of providing you the best heart care possible IS AN ACCURATE KNOWLEDGE OF YOUR MEDICINE. It  will be  essential at each office visit that you provide me with an accurate list of your medicines. When you come into the office you should have that list or another option is lining up your pill bottles  and taking a snapshot with your cell phone of all the medicines you are taking currently and show it to the nurse in the examining room. Inaccurate reporting of your medication to the nurse may lead to adverse events and medical errors. Thank you again for giving me the opportunity to be part of your heart care and it is my pleasure. All the best,    Clayton Perry MD

## 2022-11-13 NOTE — PROGRESS NOTES
CARDIOLOGY OFFICE NOTE    Clayton He MD, [address], [place], [address] [facility]  Phone [phone]; Fax [phone]  Mobile [phone]   Voice Mail [phone]      Sima Gregg MD       ATTENTION:   This medical record was transcribed using an electronic medical records/speech recognition system. Although proofread, it may and can contain electronic, spelling and other errors. Corrections may be executed at a later time. Please feel free to contact us for any clarifications as needed. ICD-10-CM ICD-9-CM   1. Essential hypertension  I10 401.9   2. Pure hypercholesterolemia  E78.00 272.0   3. Coronary artery disease involving native coronary artery of native heart without angina pectoris  I25.10 414.01   4. Severe obesity (BMI 35.0-35.9 with comorbidity) [facility])  E66.01 278.01    Z68.35 V85.35            Fermín Brower. is a 81 y.o. male with  referred for history of CAD stenting LAD, dyslipidemia, hypertension, possible JOVI . Cardiac risk factors: dyslipidemia, obesity, sedentary life style, male gender, hypertension  I have personally obtained the history from the patient. HISTORY OF PRESENTING ILLNESS       Mr. Татьяна Simmons is a very pleasant gentleman who comes in for follow-up of CAD and hypertension dyslipidemia. We talked about his LDL going up to 90 and we are increasing his Crestor and he is in agreement to this plan  He has been followed by [facility] cardiology in [facility]. Is a history of CAD status post stenting of his LAD and 2014. He may feel slightly more \"run down\". We did talk about the fact that his cholesterol was at goal at 75 and repeat cholesterol in 6 months. He also has aortic root diameter 3.8 cm and this needs to be reevaluated through echo. Aortic root was about 4 cm and we will recheck this in 6 months.          ACTIVE PROBLEM LIST     Patient Active Problem List    Diagnosis Date Noted    Prediabetes 02/25/2022    Obesity, Class II, BMI 35-39.9 02/22/2022    Gastroesophageal reflux disease 03/11/2021    Environmental and seasonal allergies 03/11/2021    Coronary artery disease involving native coronary artery of native heart without angina pectoris     Essential hypertension     Hyperlipidemia     Gout            PAST MEDICAL HISTORY     Past Medical History:   Diagnosis Date    CAD (coronary artery disease)     Essential hypertension     Gout     Hyperlipidemia            PAST SURGICAL HISTORY     Past Surgical History:   Procedure Laterality Date    HX CORONARY STENT PLACEMENT  2014    HX KNEE REPLACEMENT Left 2017          ALLERGIES     No Known Allergies       FAMILY HISTORY     Family History   Problem Relation Age of Onset    Heart Failure Mother     Stroke Father     Diabetes Father     Hypertension Sister     Heart Attack Brother     Diabetes Brother     Diabetes Son     No Known Problems Son     No Known Problems Daughter     negative for cardiac disease       SOCIAL HISTORY     Social History     Socioeconomic History    Marital status:      Spouse name: Ramon Verma    Number of children: 3   Occupational History    Occupation: Retired - [employer]   Tobacco Use    Smoking status: Never     Passive exposure: Never    Smokeless tobacco: Never   Substance and Sexual Activity    Alcohol use: Not Currently    Drug use: Never         MEDICATIONS     Current Outpatient Medications   Medication Sig    metoprolol succinate (TOPROL-XL) 25 mg XL tablet Take 0.5 Tablets by mouth daily.     omeprazole (PRILOSEC) 20 mg capsule take 1 capsule by mouth every morning    allopurinoL (ZYLOPRIM) 300 mg tablet take 1 tablet by mouth once daily    Vitamin D3 50 mcg (2,000 unit) cap capsule take 1 capsule by mouth daily    lisinopriL (PRINIVIL, ZESTRIL) 40 mg tablet take 1 tablet by mouth once daily    rosuvastatin (CRESTOR) 20 mg tablet take 1 tablet by mouth nightly    montelukast (SINGULAIR) 10 mg tablet take 1 tablet by mouth daily    amLODIPine (NORVASC) 10 mg tablet take 1 tablet by mouth once daily for blood pressure    aspirin delayed-release 81 mg tablet Take 81 mg by mouth daily. No current facility-administered medications for this visit. I have reviewed the nurses notes, vitals, problem list, allergy list, medical history, family, social history and medications. REVIEW OF SYMPTOMS   Pertinent positives per HPI  General: Pt denies excessive weight gain or loss. Pt is able to conduct ADL's  HEENT: Denies blurred vision, headaches, hearing loss, epistaxis and difficulty swallowing. Respiratory: Denies cough, congestion, shortness of breath, MENA, wheezing or stridor. Cardiovascular: Denies precordial pain, palpitations, edema or PND  Gastrointestinal: Denies poor appetite, indigestion, abdominal pain or blood in stool  Genitourinary: Denies hematuria, dysuria, increased urinary frequency  Musculoskeletal: Denies joint pain or swelling from muscles or joints  Neurologic: Denies tremor, paresthesias, headache, or sensory motor disturbance  Psychiatric: Denies confusion, insomnia, depression  Integumentray: Denies rash, itching or ulcers. Hematologic: Denies easy bruising, bleeding     PHYSICAL EXAMINATION      Vitals:    11/14/22 1311   BP: 138/80   Pulse: (!) 57   SpO2: 97%   Weight: 261 lb (118.4 kg)   Height: 5' 10\" (1.778 m)         General: Well developed, in no acute distress. Overweight  HEENT: No jaundice, oral mucosa moist, no oral ulcers  Neck: Supple, no stiffness, no lymphadenopathy, supple  Heart:   Slow and regular  Respiratory: Clear bilaterally x 4, no wheezing or rales  Extremities:  No edema, normal cap refill, no cyanosis. Musculoskeletal: No clubbing, no deformities  Neuro: A&Ox3, speech clear, gait stable, cooperative, no focal neurologic deficits            EKG: (9/5/2022)-sinus bradycardia at 48 bpm     DIAGNOSTIC DATA     1. Cardiac Cath   2014- stent to LAD     2.  Echo   6/24/19- EF 60-65%, mild concentric LV hypertrophy, inferior wall appears mildly hypokinetic, trileaflet AV, aortic root mildly dilated, aortic root diameter 3.8 cm   10/6/22-EF 59%, consistent with concentric hypertrophy, Tricuspid AV  -Mild sclerosis of AV cusp, Mild annular calcification of MV, Aorta: Ao Root diameter is 4.0 cm. Ao Ascending diameter is 3.6 cm    3. Stress Test   5/13/21-Lexiscan/Cardiolite-no ischemia    4. Lipids  6/2/21- 6/2/21- , HDL 52, ,   1/25/22- , HDL 45, LDL 75, TG 84  10/25/22- , HDL 51, LDL 90, TG 85         LABORATORY DATA            Lab Results   Component Value Date/Time    WBC 6.7 02/22/2022 10:50 AM    HGB 12.2 02/22/2022 10:50 AM    HCT 37.7 02/22/2022 10:50 AM    PLATELET 689 48/00/2866 10:50 AM    MCV 96.7 02/22/2022 10:50 AM      Lab Results   Component Value Date/Time    Sodium 140 08/03/2021 08:10 AM    Potassium 4.6 08/03/2021 08:10 AM    Chloride 109 (H) 08/03/2021 08:10 AM    CO2 23 08/03/2021 08:10 AM    Anion gap 8 08/03/2021 08:10 AM    Glucose 94 08/03/2021 08:10 AM    BUN 27 (H) 08/03/2021 08:10 AM    Creatinine 1.38 (H) 08/03/2021 08:10 AM    BUN/Creatinine ratio 20 08/03/2021 08:10 AM    GFR est AA >60 08/03/2021 08:10 AM    GFR est non-AA 51 (L) 08/03/2021 08:10 AM    Calcium 9.1 08/03/2021 08:10 AM    Bilirubin, total 0.3 10/25/2022 11:37 AM    Alk. phosphatase 89 10/25/2022 11:37 AM    Protein, total 6.7 10/25/2022 11:37 AM    Albumin 4.2 10/25/2022 11:37 AM    Globulin 3.1 08/03/2021 08:10 AM    A-G Ratio 1.2 08/03/2021 08:10 AM    ALT (SGPT) 16 10/25/2022 11:37 AM           ASSESSMENT/RECOMMENDATIONS:.       1 hypertension  -Blood pressure is much better today.   Says that his heart rates 49 but is totally asymptomatic.  -If he has any symptoms of dizziness would suggest stopping his metoprolol.  -will reduce the metorpolol xl 12.5 mg and he will keep eye on BP and and HR    2 dyslipidemia  -Last LDL was 75 and it has gone up slightly to 95 suggest that we increase his Crestor to 40 mg a day repeat labs in 2 months    Lab Results   Component Value Date/Time    Cholesterol, total 157 10/25/2022 11:37 AM    HDL Cholesterol 51 10/25/2022 11:37 AM    LDL, calculated 90 10/25/2022 11:37 AM    LDL, calculated 111 (H) 08/03/2021 08:10 AM    VLDL, calculated 16 10/25/2022 11:37 AM    VLDL, calculated 24 08/03/2021 08:10 AM    Triglyceride 85 10/25/2022 11:37 AM    CHOL/HDL Ratio 3.5 08/03/2021 08:10 AM       3 CAD/shortness of breath  -History of stenting of the LAD back 2014.   -Last stress test was in May 2021  -No symptoms of angina  -Recent echo on 10/6/2022 shows normal EF concentric LVH    4. Possible JOVI  -HE HAD SLEEP STUDY  -Was as though he was only having 6-8 apneic episodes at night    5. Obesity class II  -BMI is 37  -Is attempting to go back to the gym and I believe he is very motivated    6. Slight aortic root dilatation of around 4 cm  -Follow up with a limited echo in 6 months      Return in 6 months         No orders of the defined types were placed in this encounter. We discussed the expected course, resolution and complications of the diagnosis(es) in detail. Medication risks, benefits, costs, interactions, and alternatives were discussed as indicated. I advised him to contact the office if his condition worsens, changes or fails to improve as anticipated. He expressed understanding with the diagnosis(es) and plan          Follow-up and Dispositions    Return in about 6 months (around 5/14/2023). I have discussed the diagnosis with  Joyce Daija. and the intended plan as seen in the above orders. Questions were answered concerning future plans. I have discussed medication side effects and warnings with the patient as well. Thank you,  Candi Stearns MD for involving me in the care of  Joyce Choe. . Please do not hesitate to contact me for further questions/concerns. Clayton Callaway MD, [address]      [facility], [address] [place] [address], [facility] [address]      [phone] / [phone] Fax

## 2022-11-14 ENCOUNTER — OFFICE VISIT (OUTPATIENT)
Dept: CARDIOLOGY CLINIC | Age: 72
End: 2022-11-14
Payer: MEDICARE

## 2022-11-14 VITALS
HEIGHT: 70 IN | HEART RATE: 57 BPM | SYSTOLIC BLOOD PRESSURE: 138 MMHG | DIASTOLIC BLOOD PRESSURE: 80 MMHG | WEIGHT: 261 LBS | OXYGEN SATURATION: 97 % | BODY MASS INDEX: 37.37 KG/M2

## 2022-11-14 DIAGNOSIS — E66.01 SEVERE OBESITY (BMI 35.0-35.9 WITH COMORBIDITY) (HCC): ICD-10-CM

## 2022-11-14 DIAGNOSIS — I25.10 CORONARY ARTERY DISEASE INVOLVING NATIVE CORONARY ARTERY OF NATIVE HEART WITHOUT ANGINA PECTORIS: ICD-10-CM

## 2022-11-14 DIAGNOSIS — E78.00 PURE HYPERCHOLESTEROLEMIA: ICD-10-CM

## 2022-11-14 DIAGNOSIS — I10 ESSENTIAL HYPERTENSION: Primary | ICD-10-CM

## 2022-11-14 PROCEDURE — G8536 NO DOC ELDER MAL SCRN: HCPCS | Performed by: SPECIALIST

## 2022-11-14 PROCEDURE — G8754 DIAS BP LESS 90: HCPCS | Performed by: SPECIALIST

## 2022-11-14 PROCEDURE — G8417 CALC BMI ABV UP PARAM F/U: HCPCS | Performed by: SPECIALIST

## 2022-11-14 PROCEDURE — G0463 HOSPITAL OUTPT CLINIC VISIT: HCPCS | Performed by: SPECIALIST

## 2022-11-14 PROCEDURE — 1123F ACP DISCUSS/DSCN MKR DOCD: CPT | Performed by: SPECIALIST

## 2022-11-14 PROCEDURE — 3078F DIAST BP <80 MM HG: CPT | Performed by: SPECIALIST

## 2022-11-14 PROCEDURE — 1101F PT FALLS ASSESS-DOCD LE1/YR: CPT | Performed by: SPECIALIST

## 2022-11-14 PROCEDURE — 3017F COLORECTAL CA SCREEN DOC REV: CPT | Performed by: SPECIALIST

## 2022-11-14 PROCEDURE — G8752 SYS BP LESS 140: HCPCS | Performed by: SPECIALIST

## 2022-11-14 PROCEDURE — G8427 DOCREV CUR MEDS BY ELIG CLIN: HCPCS | Performed by: SPECIALIST

## 2022-11-14 PROCEDURE — 99214 OFFICE O/P EST MOD 30 MIN: CPT | Performed by: SPECIALIST

## 2022-11-14 PROCEDURE — 3074F SYST BP LT 130 MM HG: CPT | Performed by: SPECIALIST

## 2022-11-14 PROCEDURE — G8432 DEP SCR NOT DOC, RNG: HCPCS | Performed by: SPECIALIST

## 2022-11-14 RX ORDER — ROSUVASTATIN CALCIUM 40 MG/1
40 TABLET, COATED ORAL
Qty: 30 TABLET | Refills: 5 | Status: SHIPPED | OUTPATIENT
Start: 2022-11-14

## 2022-11-14 NOTE — LETTER
11/14/2022    Patient: Rusty Cortez. YOB: 1950   Date of Visit: 11/14/2022     [address] [facility] [address]    Dear Mey Brian MD,      Thank you for referring Mr. Mj Coffey to [facility] for evaluation. My notes for this consultation are attached. If you have questions, please do not hesitate to call me. I look forward to following your patient along with you.       Sincerely,    Yaw Barlow MD

## 2022-11-14 NOTE — PROGRESS NOTES
Brook Regalado. is a 81 y.o. male    Visit Vitals  /80 (BP 1 Location: Left upper arm, BP Patient Position: Sitting, BP Cuff Size: Large adult)   Pulse (!) 57   Ht 5' 10\" (1.778 m)   Wt 261 lb (118.4 kg)   SpO2 97%   BMI 37.45 kg/m²       Chief Complaint   Patient presents with    Coronary Artery Disease    Cholesterol Problem    Hypertension       Chest pain YES  SOB YES  Dizziness YES  Swelling NO  Recent hospital visit NO  Refills NO  COVID VACCINE STATUS YES  HAD COVID?  NO

## 2022-11-29 ENCOUNTER — OFFICE VISIT (OUTPATIENT)
Dept: FAMILY MEDICINE CLINIC | Age: 72
End: 2022-11-29
Payer: MEDICARE

## 2022-11-29 VITALS
OXYGEN SATURATION: 95 % | HEART RATE: 50 BPM | SYSTOLIC BLOOD PRESSURE: 131 MMHG | WEIGHT: 262.38 LBS | RESPIRATION RATE: 18 BRPM | BODY MASS INDEX: 37.56 KG/M2 | TEMPERATURE: 97.5 F | DIASTOLIC BLOOD PRESSURE: 75 MMHG | HEIGHT: 70 IN

## 2022-11-29 DIAGNOSIS — M25.561 CHRONIC PAIN OF RIGHT KNEE: Primary | ICD-10-CM

## 2022-11-29 DIAGNOSIS — G89.29 CHRONIC PAIN OF RIGHT KNEE: Primary | ICD-10-CM

## 2022-11-29 PROCEDURE — G0463 HOSPITAL OUTPT CLINIC VISIT: HCPCS | Performed by: STUDENT IN AN ORGANIZED HEALTH CARE EDUCATION/TRAINING PROGRAM

## 2022-11-29 RX ORDER — BETAMETHASONE SODIUM PHOSPHATE AND BETAMETHASONE ACETATE 3; 3 MG/ML; MG/ML
12 INJECTION, SUSPENSION INTRA-ARTICULAR; INTRALESIONAL; INTRAMUSCULAR; SOFT TISSUE ONCE
Qty: 1 ML | Refills: 0
Start: 2022-11-29 | End: 2022-11-29

## 2022-11-29 RX ORDER — LIDOCAINE HYDROCHLORIDE 10 MG/ML
2 INJECTION INFILTRATION; PERINEURAL ONCE
Qty: 1 ML | Refills: 0
Start: 2022-11-29 | End: 2022-11-29

## 2022-11-29 NOTE — PROGRESS NOTES
Chief Complaint   Patient presents with    Knee Pain     Right knee pain, on and off for years. Visit Vitals  /75 (BP 1 Location: Left upper arm, BP Patient Position: Sitting, BP Cuff Size: Large adult)   Pulse (!) 50   Temp 97.5 °F (36.4 °C) (Temporal)   Resp 18   Ht 5' 10\" (1.778 m)   Wt 262 lb 6 oz (119 kg)   SpO2 95%   BMI 37.65 kg/m²     1. Have you been to the ER, urgent care clinic since your last visit? Hospitalized since your last visit? No    2. Have you seen or consulted any other health care providers outside of the [address] [place] [address] since your last visit? Include any pap smears or colon screening.  No

## 2022-11-29 NOTE — PROGRESS NOTES
[address] [place] [address]   Office [phone]  Fax [phone]     11/29/2022   Chief Complaint   Patient presents with    Knee Pain     Right knee pain, on and off for years. HPI:  Marco Ashley is a 81 y.o. male who presents to clinic today for R knee pain    R knee pain:   - Chronic  - Pain is worsening and affecting daily activities - doesn't want to be as active due to pain  - Had CS shot in R knee last many years ago  - H/o L knee replacement. L knee pain was worse at that time  - Used to be a   - No joint swelling, fever, joint erythema    Patients past medical, surgical and family histories were reviewed. Allergies and Medications reviewed and updated. ROS: See HPI    Objective:  Vitals:    11/29/22 0838   BP: 131/75   Pulse: (!) 50   Resp: 18   Temp: 97.5 °F (36.4 °C)   TempSrc: Temporal   SpO2: 95%   Weight: 262 lb 6 oz (119 kg)   Height: 5' 10\" (1.778 m)     Body mass index is 37.65 kg/m². Physical Exam  General: Patient alert and oriented and in NAD  HEENT: PER/EOMI, no conjunctival pallor or scleral icterus.     Lungs: Unlabored breathing  Abd: non-tender, non-distended  Ext: No edema  Skin: No rashes or lesions noted on exposed skin,  Psych: Appropriate mood and affect    Musculoskeletal:  Knee: right  Knee Effusion: None  Quadriceps atrophy: None   Popliteal (Bakers) Cyst: Negative   Patellofemoral crepitus: Negative  Q angle:     ROM:  Flexion: 130  Extension: 0   Hip IR/ER: FROM without pain    Dynamic Test:  Gait: Normal   Assistive devices: None    Palpation:   Patella tenderness: None  Patellar tendon tenderness: None  Quad tendon tenderness: None  Medial joint line tenderness: Positive  Lateral joint line tenderness: Positive  MCL tenderness: None  LCL tenderness: None  Medial facet tenderness: None  Lateral facet tenderness: None  Condyle tenderness: None  Tibia tubercle tenderness: None  Proximal fibula tenderness: None  Plica tenderness: None  Prepatellar bursa tenderness: None  Pes bursa tenderness: None  ITB tenderness: None    Ligament/Meniscal Exam:  Patellar Grind: Negative   Patellar apprehension (medial/lateral): Negative   Lochman: Negative, with good endpoint   Anterior Drawer: Negative   Posterior Drawer: Negative   Valgus stress: Negative with good endpoint   Varus stress: Negative with good endpoint   Jagdish: Negative     Strength (0-5/5):   Flexion: Left: 5/5    Right: 5/5    Extension: Left: 5/5    Right: 5/5    Hip abduction: 5/5    Hip adduction: 5/5       Reviewed R knee xray: Degenerative changes seen with joint space narrowing  No results found for this or any previous visit (from the past 24 hour(s)). Assessment and Plan:  Diagnoses and all orders for this visit:    1. Chronic pain of right knee  Assessment & Plan:  Likely 2/2 OA  - CS injection with Celestone 12mg and Lidocaine 1% 2 ml  - Physical therapy  - Return precautions given    Orders:  -     XR KNEE RT MIN 4 V; Future  -     REFERRAL TO PHYSICAL THERAPY  -     betamethasone (Celestone Soluspan) 6 mg/mL injection; 2 mL by Intra artICUlar route once for 1 dose. -     lidocaine (XYLOCAINE) 10 mg/mL (1 %) injection; 2 mL by Intra artICUlar route once for 1 dose. Follow-up and Dispositions    Return in about 6 weeks (around 1/10/2023) for Knee pain not improving. Follow up in 4 weeks for medicare/annual wellness. I have discussed the aforementioned diagnoses and plan with the patient in detail. I have provided information in person and/or in AVS. All questions answered prior to discharge. I discussed this patient with Dr. Narendra Clemons (Attending Physician)   Signed By:  Chava Byrne MD     Family Medicine Resident           Indications:   Unexplained monoarthritis    Procedure:  After consent was obtained, using sterile technique the R knee joint was prepped using Chlorprep. Local cold spray used. . The joint was entered and 0 ml's of fluid was withdrawn. Celestone 12 mg was mixed with 1% lidocaine 2 ml  and injected into the joint and the needle withdrawn. The procedure was well tolerated. The patient is asked to continue to rest the joint for a few more days before resuming regular activities. It may be more painful for the first 1-2 days. Watch for fever, or increased swelling or persistent pain in the joint. Call or return to clinic prn if such symptoms occur or there is failure to improve as anticipated.     [facility]  OFFICE PROCEDURE PROGRESS NOTE        Chart reviewed for the following:   Louis Munoz MD, have reviewed the History, Physical and updated the Allergic reactions for [address] [place] performed immediately prior to start of procedure:   Louis Munoz MD, have performed the following reviews on Ashwin Morocho. prior to the start of the procedure:            * Patient was identified by name and date of birth   * Agreement on procedure being performed was verified  * Risks and Benefits explained to the patient  * Procedure site verified and marked as necessary  * Patient was positioned for comfort  * Consent was signed and verified     Time: 10:00 AM      Date of procedure: 11/29/2022    Procedure performed by:  David Abel MD    Provider assisted by: MINDA     Patient assisted by: self    How tolerated by patient: tolerated the procedure well with no complications    Post Procedural Pain Scale: 0 - No Hurt    Comments: none

## 2022-11-29 NOTE — ASSESSMENT & PLAN NOTE
Likely 2/2 OA  - CS injection with Celestone 12mg and Lidocaine 1% 2 ml  - Physical therapy  - Return precautions given

## 2022-12-06 ENCOUNTER — PATIENT MESSAGE (OUTPATIENT)
Dept: SLEEP MEDICINE | Age: 72
End: 2022-12-06

## 2022-12-06 DIAGNOSIS — G47.33 OSA (OBSTRUCTIVE SLEEP APNEA): Primary | ICD-10-CM

## 2022-12-07 NOTE — TELEPHONE ENCOUNTER
438.9 minutes recorded of which 404 minutes spent asleep with a sleep efficiency of 92%. Sleep onset of 20.3 minutes; REM onset at 115 minutes with total REM representing 27.5% of sleep time. 119 respiratory events occurred of which 57 hypopnea and 62 apnea (10 central, 52 obstructive). Overall AHI 17.7/h.  REM related AHI 50.3/h. Events more prominently right lateral.  Minimal SaO2 78%. Impression: Sleep disordered breathing, more prominently in REM sleep. Recommendation: APAP 7-18 cm    Sleep technologist: Please review study results with the patient. Order has been entered for APAP 7-18 cm. Please schedule first compliance appointment.

## 2022-12-08 ENCOUNTER — DOCUMENTATION ONLY (OUTPATIENT)
Dept: SLEEP MEDICINE | Age: 72
End: 2022-12-08

## 2022-12-08 ENCOUNTER — PATIENT MESSAGE (OUTPATIENT)
Dept: SLEEP MEDICINE | Age: 72
End: 2022-12-08

## 2023-01-04 ENCOUNTER — OFFICE VISIT (OUTPATIENT)
Dept: FAMILY MEDICINE CLINIC | Age: 73
End: 2023-01-04
Payer: MEDICARE

## 2023-01-04 VITALS
WEIGHT: 261.5 LBS | TEMPERATURE: 97.8 F | OXYGEN SATURATION: 94 % | SYSTOLIC BLOOD PRESSURE: 130 MMHG | HEART RATE: 54 BPM | RESPIRATION RATE: 18 BRPM | BODY MASS INDEX: 37.44 KG/M2 | DIASTOLIC BLOOD PRESSURE: 72 MMHG | HEIGHT: 70 IN

## 2023-01-04 DIAGNOSIS — E78.00 PURE HYPERCHOLESTEROLEMIA: ICD-10-CM

## 2023-01-04 DIAGNOSIS — Z71.89 ADVANCED DIRECTIVES, COUNSELING/DISCUSSION: ICD-10-CM

## 2023-01-04 DIAGNOSIS — K21.9 GASTROESOPHAGEAL REFLUX DISEASE, UNSPECIFIED WHETHER ESOPHAGITIS PRESENT: ICD-10-CM

## 2023-01-04 DIAGNOSIS — N39.43 POST-VOID DRIBBLING: ICD-10-CM

## 2023-01-04 DIAGNOSIS — M10.9 GOUT, UNSPECIFIED CAUSE, UNSPECIFIED CHRONICITY, UNSPECIFIED SITE: ICD-10-CM

## 2023-01-04 DIAGNOSIS — Z12.5 SPECIAL SCREENING FOR MALIGNANT NEOPLASM OF PROSTATE: ICD-10-CM

## 2023-01-04 DIAGNOSIS — Z00.00 MEDICARE ANNUAL WELLNESS VISIT, SUBSEQUENT: Primary | ICD-10-CM

## 2023-01-04 DIAGNOSIS — K52.9 CHRONIC DIARRHEA: ICD-10-CM

## 2023-01-04 DIAGNOSIS — I25.10 CORONARY ARTERY DISEASE INVOLVING NATIVE CORONARY ARTERY OF NATIVE HEART WITHOUT ANGINA PECTORIS: ICD-10-CM

## 2023-01-04 DIAGNOSIS — R73.03 PREDIABETES: ICD-10-CM

## 2023-01-04 DIAGNOSIS — H90.12 CONDUCTIVE HEARING LOSS OF LEFT EAR, UNSPECIFIED HEARING STATUS ON CONTRALATERAL SIDE: Chronic | ICD-10-CM

## 2023-01-04 DIAGNOSIS — J30.89 ENVIRONMENTAL AND SEASONAL ALLERGIES: ICD-10-CM

## 2023-01-04 DIAGNOSIS — E66.01 SEVERE OBESITY (BMI 35.0-39.9) WITH COMORBIDITY (HCC): ICD-10-CM

## 2023-01-04 DIAGNOSIS — I10 ESSENTIAL HYPERTENSION: ICD-10-CM

## 2023-01-04 PROCEDURE — G0439 PPPS, SUBSEQ VISIT: HCPCS | Performed by: FAMILY MEDICINE

## 2023-01-04 PROCEDURE — 3075F SYST BP GE 130 - 139MM HG: CPT | Performed by: FAMILY MEDICINE

## 2023-01-04 PROCEDURE — G8510 SCR DEP NEG, NO PLAN REQD: HCPCS | Performed by: FAMILY MEDICINE

## 2023-01-04 PROCEDURE — 1123F ACP DISCUSS/DSCN MKR DOCD: CPT | Performed by: FAMILY MEDICINE

## 2023-01-04 PROCEDURE — 3078F DIAST BP <80 MM HG: CPT | Performed by: FAMILY MEDICINE

## 2023-01-04 PROCEDURE — G8427 DOCREV CUR MEDS BY ELIG CLIN: HCPCS | Performed by: FAMILY MEDICINE

## 2023-01-04 PROCEDURE — 1101F PT FALLS ASSESS-DOCD LE1/YR: CPT | Performed by: FAMILY MEDICINE

## 2023-01-04 PROCEDURE — G8417 CALC BMI ABV UP PARAM F/U: HCPCS | Performed by: FAMILY MEDICINE

## 2023-01-04 PROCEDURE — G8536 NO DOC ELDER MAL SCRN: HCPCS | Performed by: FAMILY MEDICINE

## 2023-01-04 PROCEDURE — 99214 OFFICE O/P EST MOD 30 MIN: CPT | Performed by: FAMILY MEDICINE

## 2023-01-04 PROCEDURE — G0463 HOSPITAL OUTPT CLINIC VISIT: HCPCS | Performed by: FAMILY MEDICINE

## 2023-01-04 PROCEDURE — 3017F COLORECTAL CA SCREEN DOC REV: CPT | Performed by: FAMILY MEDICINE

## 2023-01-04 RX ORDER — MONTELUKAST SODIUM 10 MG/1
10 TABLET ORAL DAILY
Qty: 90 TABLET | Refills: 3 | Status: SHIPPED | OUTPATIENT
Start: 2023-01-04

## 2023-01-04 RX ORDER — AMLODIPINE BESYLATE 10 MG/1
TABLET ORAL
Qty: 90 TABLET | Refills: 3 | Status: SHIPPED | OUTPATIENT
Start: 2023-01-04

## 2023-01-04 RX ORDER — OMEPRAZOLE 20 MG/1
CAPSULE, DELAYED RELEASE ORAL
Qty: 90 CAPSULE | Refills: 3 | Status: SHIPPED | OUTPATIENT
Start: 2023-01-04

## 2023-01-04 RX ORDER — METOPROLOL SUCCINATE 25 MG/1
12.5 TABLET, EXTENDED RELEASE ORAL DAILY
Qty: 90 TABLET | Refills: 3 | Status: SHIPPED | OUTPATIENT
Start: 2023-01-04

## 2023-01-04 RX ORDER — ROSUVASTATIN CALCIUM 40 MG/1
40 TABLET, COATED ORAL
Qty: 90 TABLET | Refills: 3 | Status: SHIPPED | OUTPATIENT
Start: 2023-01-04

## 2023-01-04 RX ORDER — ALLOPURINOL 300 MG/1
300 TABLET ORAL DAILY
Qty: 90 TABLET | Refills: 3 | Status: SHIPPED | OUTPATIENT
Start: 2023-01-04

## 2023-01-04 RX ORDER — LISINOPRIL 40 MG/1
40 TABLET ORAL DAILY
Qty: 90 TABLET | Refills: 4 | Status: SHIPPED | OUTPATIENT
Start: 2023-01-04

## 2023-01-04 NOTE — PROGRESS NOTES
Chief Complaint   Patient presents with    Annual Wellness Visit     Visit Vitals  /72 (BP 1 Location: Right upper arm, BP Patient Position: Sitting, BP Cuff Size: Large adult)   Pulse (!) 54   Temp 97.8 °F (36.6 °C) (Temporal)   Resp 18   Ht 5' 10\" (1.778 m)   Wt 261 lb 8 oz (118.6 kg)   SpO2 94%   BMI 37.52 kg/m²     1. Have you been to the ER, urgent care clinic since your last visit? Hospitalized since your last visit? No    2. Have you seen or consulted any other health care providers outside of the [address] [place] [address] since your last visit? Include any pap smears or colon screening. No      General Health Questions   -During the past 4 weeks:   -how would you rate your health in general? Fair   -how often have you been bothered by feeling dizzy when standing up? Some days   -how much have you been bothered by bodily pain? mildly   -Have you noticed any hearing difficulties? yes   -has your physical and emotional health limited your social activities with family or friends? no    Emotional Health Questions   -Do you have a history of depression, anxiety, or emotional problems? no  -Over the past 2 weeks, have you felt down, depressed or hopeless? no  -Over the past 2 weeks, have you felt little interest or pleasure in doing things? no    Health Habits   Please describe your diet habits:   Do you get 5 servings of fruits or vegetables daily? no  Do you exercise regularly? no    Activities of Daily Living and Functional Status   -Do you need help with eating, walking, dressing, bathing, toileting, the phone, transportation, shopping, preparing meals, housework, laundry, medications or managing money? no  -In the past four weeks, was someone available to help you if you needed and wanted help with anything? yes  -Are you confident are you that you can control and manage most of your health problems? yes  -Have you been given information to help you keep track of your medications?  yes  -How often do you have trouble taking your medications as prescribed? occasionally    Fall Risk and Home Safety   Have you fallen 2 or more times in the past year? no  Does your home have rugs in the hallways? no, Do you have grab bars in the bathrooms?  no, Does your home have handrails on the stairs? yes, Do you have adequate lighting in your home? yes  Do you have smoke detectors and check them regularly?  yes  Do you have difficulties driving a car/vehicle? no  Do you always fasten your seat belt when you are in a car? no

## 2023-01-04 NOTE — ACP (ADVANCE CARE PLANNING)
Advance Care Planning     Advance Care Planning (ACP) Physician/NP/PA Conversation      Date of Conversation: 1/4/2023  Conducted with: Patient with Decision Making Capacity    Healthcare Decision Maker:     Primary Decision Maker: Gilmar Barry - [phone]  Today we documented Decision Maker(s) consistent with Legal Next of Kin hierarchy. Care Preferences:  Ventilation: \"If you were unable to breathe on your own and your chance of recovery was unlikely, what would be your preference about the use of a ventilator (breathing machine) if it was available to you? \"   The patient would NOT desire the use of a ventilator. Resuscitation: \"In the event your heart stopped as a result of an underlying serious health condition, would you want attempts to be made to restart your heart, or would you prefer a natural death? \"   Yes, attempt to resuscitate.     Additional topics discussed: treatment goals, ventilation preferences, resuscitation preferences, and end of life care preferences (vegetative state/imminent death)    Conversation Outcomes / Follow-Up Plan:   ACP in process - information provided, considering goals and options  Reviewed DNR/DNI and patient elects Full Code (Attempt Resuscitation)     Length of Voluntary ACP Conversation in minutes:  <16 minutes (Non-Billable)    Larisa Jordan MD

## 2023-01-04 NOTE — PROGRESS NOTES
This is the Subsequent Medicare Annual Wellness Exam, performed 12 months or more after the Initial AWV or the last Subsequent AWV    I have reviewed the patient's medical history in detail and updated the computerized patient record. Assessment/Plan   Education and counseling provided:  Are appropriate based on today's review and evaluation  End-of-Life planning (with patient's consent)  Colorectal cancer screening tests  Immunizations    1. Medicare annual wellness visit, subsequent  Marty Shah. was counseled on age-appropriate/ guideline-based risk prevention behaviors and screening for a 81y.o. year old   male . We also discussed adjustments in screening based on family history if necessary. Printed instructions for preventative screening guidelines and healthy behaviors given to patient with after visit summary. Discussed recommended immunizations and colorectal cancer screening. Last Colonoscopy done 1/28/2019 by Dr. Eladia Pradhan. Polyps removed and sent to path w/o evidence of  \"Adenomatous changes or colitis. \"  Full immunization record not available. Unable to give flu vaccine today due to availability in clinic. Recommend he get this at the pharmacy. - pneumococcal 20-alma conj-dip, PF, (PREVNAR 20) 0.5 mL syrg injection; 0.5 mL by IntraMUSCular route once for 1 dose. Dispense: 0.5 mL; Refill: 0    2. Advanced directives, counseling/discussion  See ACP note  - FULL CODE    3. Conductive hearing loss of left ear, unspecified hearing status on contralateral side  Time spent counseling patient on hearing loss an when to seek an audiologist evaluation for hearing aids. His hearing loss seems to cause some communication troubles with his wife at home. Has tried amplification devices in the past with poor experiences. Is considering evaluation, but not ready to make that choice today. 4. Essential hypertension   BP at goal today.   Continue his Toprol-XL, lisinopril and amlodipine at their current doses. Getting updated labs. - amLODIPine (NORVASC) 10 mg tablet; take 1 tablet by mouth once daily for blood pressure  Dispense: 90 Tablet; Refill: 3  - lisinopriL (PRINIVIL, ZESTRIL) 40 mg tablet; Take 1 Tablet by mouth daily. Dispense: 90 Tablet; Refill: 4  - metoprolol succinate (TOPROL-XL) 25 mg XL tablet; Take 0.5 Tablets by mouth daily. Dispense: 90 Tablet; Refill: 3  - CBC W/O DIFF; Future  - METABOLIC PANEL, BASIC; Future    5. Coronary artery disease involving native coronary artery of native heart without angina pectoris  Follows with cardiology. Last appointment with Dr. Jose Luis Dowling in November. Chart reviewed. Continue regular follow-up. - amLODIPine (NORVASC) 10 mg tablet; take 1 tablet by mouth once daily for blood pressure  Dispense: 90 Tablet; Refill: 3  - lisinopriL (PRINIVIL, ZESTRIL) 40 mg tablet; Take 1 Tablet by mouth daily. Dispense: 90 Tablet; Refill: 4  - metoprolol succinate (TOPROL-XL) 25 mg XL tablet; Take 0.5 Tablets by mouth daily. Dispense: 90 Tablet; Refill: 3    6. Post-void dribbling  Some issues with post-void dribbling, incomplete emptying and urinary urgency. Getting UA and PSA. Given information on bladder training. If sx persist despite bladder training, would refer to urology for urodynamic study.  - URINALYSIS W/ RFLX MICROSCOPIC; Future    7. Special screening for malignant neoplasm of prostate  The natural history of prostate cancer and ongoing controversy regarding screening and potential treatment outcomes of prostate cancer has been discussed with the patient. The meaning of a false positive PSA and a false negative PSA has been discussed. He indicates understanding of the limitations of this screening test and wishes  to proceed with screening PSA testing.    - PSA SCREENING (SCREENING); Future    8. Gastroesophageal reflux disease, unspecified whether esophagitis present  Stable on current dose of omeprazole. Will continue at this time. May benefit from weaning dose in the future. - omeprazole (PRILOSEC) 20 mg capsule; take 1 capsule by mouth every morning  Dispense: 90 Capsule; Refill: 3    9. Severe obesity (BMI 35.0-39. 9) with comorbidity ([address]  10. Body mass index 37.0-37.9, adult  Counseled on diet and exercise. Exercise is limited by his knee pain. Work on increasing fiber and reducing simple carbohydrates. 11. Chronic diarrhea  Continues to have issues with diarrhea. Will get elastase to r/o EPI  - PANCREATIC ELASTASE, FECAL; Future    12. Prediabetes  Lab Results   Component Value Date/Time    Hemoglobin A1c 5.7 (H) 08/03/2021 08:10 AM   Will get updated A1c.  Keep working on lifestyle changes.   - HEMOGLOBIN A1C WITH EAG; Future    13. Pure hypercholesterolemia  Known CAD. On high-dose Crestor. Last LDL 90 with goal of <70. Keep working on lifestyle changes. - rosuvastatin (CRESTOR) 40 mg tablet; Take 1 Tablet by mouth nightly. Dispense: 90 Tablet; Refill: 3    14. Gout, unspecified cause, unspecified chronicity, unspecified site  Requesting refill. Gout is stable  - allopurinoL (ZYLOPRIM) 300 mg tablet; Take 1 Tablet by mouth daily. Dispense: 90 Tablet; Refill: 3    15. Environmental and seasonal allergies  Requesting refill. Allergies are stable. - montelukast (SINGULAIR) 10 mg tablet; Take 1 Tablet by mouth daily. Dispense: 90 Tablet; Refill: 3         Depression Risk Factor Screening     3 most recent PHQ Screens 1/4/2023   Little interest or pleasure in doing things Not at all   Feeling down, depressed, irritable, or hopeless Not at all   Total Score PHQ 2 0       Alcohol Risk Screen    Do you average more than 1 drink per night or more than 7 drinks a week: No    In the past three months have you have had more than 4 drinks containing alcohol on one occasion: No        Functional Ability and Level of Safety    Hearing: The patient needs further evaluation. Deferred at tis time      Activities of Daily Living:   The home contains: handrails      Ambulation: with no difficulty     Fall Risk:  Fall Risk Assessment, last 12 mths 9/2/2022   Able to walk? Yes   Fall in past 12 months? 1   Do you feel unsteady? 0   Are you worried about falling 0      Abuse Screen:  Patient is not abused       Cognitive Screening    Has your family/caregiver stated any concerns about your memory: no     Health Maintenance Due     Health Maintenance Due   Topic Date Due    DTaP/Tdap/Td series (1 - Tdap) Never done    Shingles Vaccine (1 of 2) Never done    Pneumococcal 65+ years (1 - PCV) Never done    COVID-19 Vaccine (3 - Booster for [place] series) 03/21/2022    Medicare Yearly Exam  07/29/2022    Flu Vaccine (1) Never done    A1C test (Diabetic or Prediabetic)  08/03/2022       Patient Care Team   Patient Care Team:  Isaias Da Silva MD as PCP - General (Family Medicine)  Isaias Da Silva MD as PCP [facility] Empaneled Provider  Matry Mason MD (Cardiovascular Disease Physician)  Arnaldo Munson MD (Gastroenterology)    Review of Systems   Constitutional:  Negative for chills and fever. HENT:  Positive for hearing loss. Negative for congestion and sore throat. Eyes:  Negative for blurred vision and double vision. Respiratory:  Negative for cough, shortness of breath and wheezing. Cardiovascular:  Negative for chest pain and palpitations. Gastrointestinal:  Negative for abdominal pain, constipation, diarrhea and nausea. Genitourinary:  Negative for dysuria, frequency, hematuria and urgency. Post-void dribbling, urinary incontinence   Musculoskeletal:  Positive for joint pain (Right Knee). Negative for back pain, falls and myalgias. Skin:  Negative for rash. Neurological:  Negative for dizziness, tremors and headaches. Psychiatric/Behavioral:  Negative for depression and memory loss. The patient is not nervous/anxious. All other systems reviewed and are negative.     Visit Vitals  /72 (BP 1 Location: Right upper arm, BP Patient Position: Sitting, BP Cuff Size: Large adult)   Pulse (!) 54   Temp 97.8 °F (36.6 °C) (Temporal)   Resp 18   Ht 5' 10\" (1.778 m)   Wt 261 lb 8 oz (118.6 kg)   SpO2 94%   BMI 37.52 kg/m²       Physical Exam  Vitals reviewed. Constitutional:       General: He is not in acute distress. Appearance: He is well-developed. He is obese. He is not diaphoretic. HENT:      Head: Normocephalic and atraumatic. Right Ear: Ear canal normal. Tympanic membrane is scarred. Left Ear: Tympanic membrane and ear canal normal.      Ears:      Brown exam findings: Lateralizes right. Right Rinne: AC > BC. Left Rinne: BC > AC. Nose: Nose normal.      Mouth/Throat:      Pharynx: No oropharyngeal exudate. Eyes:      General: No scleral icterus. Conjunctiva/sclera: Conjunctivae normal.      Pupils: Pupils are equal, round, and reactive to light. Neck:      Thyroid: No thyromegaly. Cardiovascular:      Rate and Rhythm: Regular rhythm. Bradycardia present. Heart sounds: Normal heart sounds. No murmur heard. No friction rub. No gallop. Pulmonary:      Effort: Pulmonary effort is normal. No respiratory distress. Breath sounds: Normal breath sounds. No wheezing, rhonchi or rales. Abdominal:      General: Bowel sounds are normal. There is no distension. Palpations: Abdomen is soft. Tenderness: There is no abdominal tenderness. There is no guarding or rebound. Comments: Protuberant, Diastasis Recti   Musculoskeletal:      Cervical back: Normal range of motion and neck supple. Right lower leg: No edema. Left lower leg: No edema. Lymphadenopathy:      Cervical: No cervical adenopathy. Skin:     General: Skin is warm. Findings: No rash. Neurological:      Mental Status: He is alert and oriented to person, place, and time. Cranial Nerves: No cranial nerve deficit. Sensory: No sensory deficit.       Motor: No abnormal muscle tone. Psychiatric:         Behavior: Behavior normal.         Thought Content: Thought content normal.         Judgment: Judgment normal.       History     Patient Active Problem List   Diagnosis Code    Coronary artery disease involving native coronary artery of native heart without angina pectoris I25.10    Essential hypertension I10    Hyperlipidemia E78.5    Gout M10.9    Gastroesophageal reflux disease K21.9    Environmental and seasonal allergies J30.89    Obesity, Class II, BMI 35-39.9 E66.9    Prediabetes R73.03    Chronic pain of right knee M25.561, G89.29     Past Medical History:   Diagnosis Date    CAD (coronary artery disease)     Essential hypertension     Gout     Hyperlipidemia       Past Surgical History:   Procedure Laterality Date    HX CORONARY STENT PLACEMENT  2014    HX KNEE REPLACEMENT Left 2017     Current Outpatient Medications   Medication Sig Dispense Refill    Vitamin D3 50 mcg (2,000 unit) cap capsule take 1 capsule by mouth daily 90 Capsule 0    rosuvastatin (CRESTOR) 40 mg tablet Take 1 Tablet by mouth nightly. 30 Tablet 5    metoprolol succinate (TOPROL-XL) 25 mg XL tablet Take 0.5 Tablets by mouth daily. 90 Tablet 2    omeprazole (PRILOSEC) 20 mg capsule take 1 capsule by mouth every morning 90 Capsule 3    allopurinoL (ZYLOPRIM) 300 mg tablet take 1 tablet by mouth once daily 90 Tablet 3    lisinopriL (PRINIVIL, ZESTRIL) 40 mg tablet take 1 tablet by mouth once daily 90 Tablet 4    montelukast (SINGULAIR) 10 mg tablet take 1 tablet by mouth daily 90 Tablet 3    amLODIPine (NORVASC) 10 mg tablet take 1 tablet by mouth once daily for blood pressure 90 Tablet 3    aspirin delayed-release 81 mg tablet Take 81 mg by mouth daily.        No Known Allergies    Family History   Problem Relation Age of Onset    Heart Failure Mother     Stroke Father     Diabetes Father     Hypertension Sister     Heart Attack Brother     Diabetes Brother     Diabetes Son  No Known Problems Son     No Known Problems Daughter      Social History     Tobacco Use    Smoking status: Never     Passive exposure: Never    Smokeless tobacco: Never   Substance Use Topics    Alcohol use: Not Currently         Charisse Good MD

## 2023-01-04 NOTE — PATIENT INSTRUCTIONS
Medicare Wellness Visit, Male    The best way to live healthy is to have a lifestyle where you eat a well-balanced diet, exercise regularly, limit alcohol use, and quit all forms of tobacco/nicotine, if applicable. Regular preventive services are another way to keep healthy. Preventive services (vaccines, screening tests, monitoring & exams) can help personalize your care plan, which helps you manage your own care. Screening tests can find health problems at the earliest stages, when they are easiest to treat. [facility] follows the current, evidence-based guidelines published by the [facility] ([facility]) when recommending preventive services for our patients. Because we follow these guidelines, sometimes recommendations change over time as research supports it. (For example, a prostate screening blood test is no longer routinely recommended for men with no symptoms). Of course, you and your doctor may decide to screen more often for some diseases, based on your risk and co-morbidities (chronic disease you are already diagnosed with). Preventive services for you include:  - Medicare offers their members a free annual wellness visit, which is time for you and your primary care provider to discuss and plan for your preventive service needs.  Take advantage of this benefit every year!    -Over the age of 72 should receive the recommended pneumonia vaccines.   -All adults should have a flu vaccine yearly.  -All adults should receive a tetanus vaccine every 10 years.  -Over the age of 48 should receive the shingles vaccines.    -All adults should be screened once for Hepatitis C.  -All adults age 38-68 who are overweight should have a diabetes screening test once every three years.   -Other screening tests & preventive services for persons with diabetes include: an eye exam to screen for diabetic retinopathy, a kidney function test, a foot exam, and stricter control over your cholesterol.   -Cardiovascular screening for adults with routine risk involves an electrocardiogram (ECG) at intervals determined by the provider.     -Colorectal cancer screening should be done for adults age 43-69 with no increased risk factors for colorectal cancer. There are a number of acceptable methods of screening for this type of cancer. Each test has its own benefits and drawbacks. Discuss with your provider what is most appropriate for you during your annual wellness visit. The different tests include: colonoscopy (considered the best screening method), a fecal occult blood test, a fecal DNA test, and sigmoidoscopy.    -Lung cancer screening is recommended annually with a low dose CT scan for adults between age 54 and 68, who have smoked at least 30 pack years (equivalent of 1 pack per day for 30 days), and who is a current smoker or quit less than 15 years ago. -An Abdominal Aortic Aneurysm (AAA) Screening is recommended for men age 73-68 who has ever smoked in their lifetime. Here is a list of your current Health Maintenance items (your personalized list of preventive services) with a due date:  Health Maintenance Due   Topic Date Due    DTaP/Tdap/Td  (1 - Tdap) Never done    Shingles Vaccine (1 of 2) Never done    Pneumococcal Vaccine (1 - PCV) Never done    COVID-19 Vaccine (3 - Booster for Viadeo series) 03/21/2022    Yearly Flu Vaccine (1) Never done    Hemoglobin A1C    08/03/2022            Prostate Cancer Screening: Care Instructions  Overview     Prostate cancer is the abnormal growth of cells in the prostate. The prostate is part of the male reproductive system. It is a small organ below the bladder that makes fluid for semen. Screening can help find prostate cancer early. When it's found and treated early, the cancer may be cured. But it's not always treated. That's because the treatments can cause serious side effects.  In most cases, prostate cancer isn't life-threatening. This is especially true in someone who is older and when the cancer grows slowly. Prostate cancer is a common type of cancer. Most cases occur after age 72. The disease runs in families. And it's more common in  Americans. Follow-up care is a key part of your treatment and safety. Be sure to make and go to all appointments, and call your doctor if you are having problems. It's also a good idea to know your test results and keep a list of the medicines you take. What is the screening test for prostate cancer? The main screening test for prostate cancer is the prostate-specific antigen (PSA) test. This is a blood test that measures how much PSA is in your blood. A high level may mean that you have an enlarged prostate, an infection, or cancer. Along with the PSA test, you may have a digital (finger) rectal exam. This exam checks for anything abnormal in your prostate. To do the exam, the doctor puts a lubricated, gloved finger into your rectum. If these tests suggest cancer, you may need a prostate biopsy to see if there are cancer cells in the prostate. What are the pros and cons of screening? Neither a PSA test nor a digital rectal exam can tell you for sure that you do or do not have cancer. But they can help you decide if you need more tests, such as a prostate biopsy. Screening tests may be useful because prostate cancer often doesn't cause symptoms. It can be hard to know if you have cancer until it's more advanced. And then it's harder to treat. But having a PSA test can also cause harm. The test may show high levels of PSA that aren't caused by cancer. So you could have a prostate biopsy you didn't need. Or the PSA test might be normal when there is cancer, so a cancer might not be found early. The test can also find cancers that would never have caused a problem during your lifetime. So you might have treatment that wasn't needed.   Prostate cancer usually develops late in life and grows slowly. In most cases, it doesn't shorten lives. Talk with your doctor to see if screening is right for you. Where can you learn more? Go to http://www.gray.com/  Enter R550 in the search box to learn more about \"Prostate Cancer Screening: Care Instructions. \"  Current as of: May 4, 2022               Content Version: 13.4  © 2006-2022 [employer]. Care instructions adapted under license by [employer] (which disclaims liability or warranty for this information). If you have questions about a medical condition or this instruction, always ask your healthcare professional. [address] any warranty or liability for your use of this information. Learning About Eric Gilliam  What is a living will? A living will is a legal form you use to write down the kind of care you want at the end of your life. It is used by the health professionals who will treat you if you aren't able to decide for yourself. If you put your wishes in writing, your loved ones and others will know what kind of care you want. They won't need to guess. This can ease your mind and be helpful to others. A living will is not the same as an estate or property will. An estate will explains what you want to happen with your money and property after you die. Is a living will a legal document? A living will is a legal document. Each state has its own laws about living fisher. If you move to another state, make sure that your living will is legal in the state where you now live. Or you might use a universal form that has been approved by many states. This kind of form can sometimes be completed and stored online. Your electronic copy will then be available wherever you have a connection to the Internet. In most cases, doctors will respect your wishes even if you have a form from a different state. · You don't need an  to complete a living will.  But legal advice can be helpful if your state's laws are unclear, your health history is complicated, or your family can't agree on what should be in your living will. · You can change your living will at any time. Some people find that their wishes about end-of-life care change as their health changes. · In addition to making a living will, think about completing a medical power of  form. This form lets you name the person you want to make end-of-life treatment decisions for you (your \"health care agent\") if you're not able to. Many hospitals and nursing homes will give you the forms you need to complete a living will and a medical power of . · Your living will is used only if you can't make or communicate decisions for yourself anymore. If you become able to make decisions again, you can accept or refuse any treatment, no matter what you wrote in your living will. · Your state may offer an online registry. This is a place where you can store your living will online so the doctors and nurses who need to treat you can find it right away. What should you think about when creating a living will? Talk about your end-of-life wishes with your family members and your doctor. Let them know what you want. That way the people making decisions for you won't be surprised by your choices. Think about these questions as you make your living will:  · Do you know enough about life support methods that might be used? If not, talk to your doctor so you know what might be done if you can't breathe on your own, your heart stops, or you're unable to swallow. · What things would you still want to be able to do after you receive life-support methods? Would you want to be able to walk? To speak? To eat on your own? To live without the help of machines? · If you have a choice, where do you want to be cared for? In your home? At a hospital or nursing home?   · Do you want certain Religion practices performed if you become very ill? · If you have a choice at the end of your life, where would you prefer to die? At home? In a hospital or nursing home? Somewhere else? · Would you prefer to be buried or cremated? · Do you want your organs to be donated after you die? What should you do with your living will? · Make sure that your family members and your health care agent have copies of your living will. · Give your doctor a copy of your living will to keep in your medical record. If you have more than one doctor, make sure that each one has a copy. · You may want to put a copy of your living will where it can be easily found. Where can you learn more? Go to http://www.gray.com/. Enter O180 in the search box to learn more about \"Learning About Living [place]rochristelle. \"  Current as of: August 8, 2016  Content Version: 11.3  © 8852-2115 [employer]. Care instructions adapted under license by [employer] (which disclaims liability or warranty for this information). If you have questions about a medical condition or this instruction, always ask your healthcare professional. [address] any warranty or liability for your use of this information.

## 2023-01-05 LAB
ANION GAP SERPL CALC-SCNC: 3 MMOL/L (ref 5–15)
APPEARANCE UR: CLEAR
BACTERIA URNS QL MICRO: NEGATIVE /HPF
BILIRUB UR QL: NEGATIVE
BUN SERPL-MCNC: 22 MG/DL (ref 6–20)
BUN/CREAT SERPL: 13 (ref 12–20)
CALCIUM SERPL-MCNC: 9.1 MG/DL (ref 8.5–10.1)
CHLORIDE SERPL-SCNC: 108 MMOL/L (ref 97–108)
CO2 SERPL-SCNC: 29 MMOL/L (ref 21–32)
COLOR UR: ABNORMAL
CREAT SERPL-MCNC: 1.64 MG/DL (ref 0.7–1.3)
EPITH CASTS URNS QL MICRO: ABNORMAL /LPF
ERYTHROCYTE [DISTWIDTH] IN BLOOD BY AUTOMATED COUNT: 13.6 % (ref 11.5–14.5)
EST. AVERAGE GLUCOSE BLD GHB EST-MCNC: 117 MG/DL
GLUCOSE SERPL-MCNC: 109 MG/DL (ref 65–100)
GLUCOSE UR STRIP.AUTO-MCNC: NEGATIVE MG/DL
HBA1C MFR BLD: 5.7 % (ref 4–5.6)
HCT VFR BLD AUTO: 38.8 % (ref 36.6–50.3)
HGB BLD-MCNC: 12.8 G/DL (ref 12.1–17)
HGB UR QL STRIP: NEGATIVE
KETONES UR QL STRIP.AUTO: ABNORMAL MG/DL
LEUKOCYTE ESTERASE UR QL STRIP.AUTO: NEGATIVE
MCH RBC QN AUTO: 31.4 PG (ref 26–34)
MCHC RBC AUTO-ENTMCNC: 33 G/DL (ref 30–36.5)
MCV RBC AUTO: 95.3 FL (ref 80–99)
NITRITE UR QL STRIP.AUTO: NEGATIVE
NRBC # BLD: 0 K/UL (ref 0–0.01)
NRBC BLD-RTO: 0 PER 100 WBC
PH UR STRIP: 5.5 [PH] (ref 5–8)
PLATELET # BLD AUTO: 197 K/UL (ref 150–400)
PMV BLD AUTO: 10.9 FL (ref 8.9–12.9)
POTASSIUM SERPL-SCNC: 4.7 MMOL/L (ref 3.5–5.1)
PROT UR STRIP-MCNC: NEGATIVE MG/DL
PSA SERPL-MCNC: 4.3 NG/ML (ref 0.01–4)
RBC # BLD AUTO: 4.07 M/UL (ref 4.1–5.7)
RBC #/AREA URNS HPF: ABNORMAL /HPF (ref 0–5)
SODIUM SERPL-SCNC: 140 MMOL/L (ref 136–145)
SP GR UR REFRACTOMETRY: 1.01 (ref 1–1.03)
UROBILINOGEN UR QL STRIP.AUTO: 0.2 EU/DL (ref 0.2–1)
WBC # BLD AUTO: 7.8 K/UL (ref 4.1–11.1)
WBC URNS QL MICRO: ABNORMAL /HPF (ref 0–4)

## 2023-01-06 DIAGNOSIS — N17.9 AKI (ACUTE KIDNEY INJURY) (HCC): Primary | ICD-10-CM

## 2023-01-11 DIAGNOSIS — K52.9 CHRONIC DIARRHEA: ICD-10-CM

## 2023-01-13 ENCOUNTER — DOCUMENTATION ONLY (OUTPATIENT)
Dept: SLEEP MEDICINE | Age: 73
End: 2023-01-13

## 2023-03-02 ENCOUNTER — OFFICE VISIT (OUTPATIENT)
Dept: SLEEP MEDICINE | Age: 73
End: 2023-03-02
Payer: MEDICARE

## 2023-03-02 VITALS
HEART RATE: 61 BPM | BODY MASS INDEX: 37.65 KG/M2 | HEIGHT: 70 IN | OXYGEN SATURATION: 94 % | DIASTOLIC BLOOD PRESSURE: 86 MMHG | SYSTOLIC BLOOD PRESSURE: 142 MMHG | WEIGHT: 263 LBS

## 2023-03-02 DIAGNOSIS — G47.33 OSA (OBSTRUCTIVE SLEEP APNEA): Primary | ICD-10-CM

## 2023-03-02 PROCEDURE — 3017F COLORECTAL CA SCREEN DOC REV: CPT | Performed by: SPECIALIST

## 2023-03-02 PROCEDURE — 3077F SYST BP >= 140 MM HG: CPT | Performed by: SPECIALIST

## 2023-03-02 PROCEDURE — G8432 DEP SCR NOT DOC, RNG: HCPCS | Performed by: SPECIALIST

## 2023-03-02 PROCEDURE — G8427 DOCREV CUR MEDS BY ELIG CLIN: HCPCS | Performed by: SPECIALIST

## 2023-03-02 PROCEDURE — G8536 NO DOC ELDER MAL SCRN: HCPCS | Performed by: SPECIALIST

## 2023-03-02 PROCEDURE — 1123F ACP DISCUSS/DSCN MKR DOCD: CPT | Performed by: SPECIALIST

## 2023-03-02 PROCEDURE — 99213 OFFICE O/P EST LOW 20 MIN: CPT | Performed by: SPECIALIST

## 2023-03-02 PROCEDURE — 1101F PT FALLS ASSESS-DOCD LE1/YR: CPT | Performed by: SPECIALIST

## 2023-03-02 PROCEDURE — 3079F DIAST BP 80-89 MM HG: CPT | Performed by: SPECIALIST

## 2023-03-02 PROCEDURE — G8417 CALC BMI ABV UP PARAM F/U: HCPCS | Performed by: SPECIALIST

## 2023-03-02 NOTE — PROGRESS NOTES
[address] [facility] [place]. [place], [address]  Tel.  [phone]  Fax. [address] [facility] [address]  [place], [address] [facility]  Tel.  [phone]  Fax. [phone] [address] [facility] [place]   Tel.  [phone]  Fax. [phone]         Chief Complaint       Chief Complaint   Patient presents with    Sleep Problem     1st adh         HPI        Giuliano Haywood. is a 81 y.o. male seen for follow-up. He was evaluated with a sleep study which demonstrated 119 respiratory events of which 57 hypopnea and 62 apnea (10 central, 52 obstructive). Overall AHI 17.7/h.  REM related AHI 50.3/h. Events more prominently right lateral.  Minimal SaO2 78%. Impression: Sleep disordered breathing, more prominently in REM sleep. Recommendation: APAP 7-18 cm. Average pressure 9 cm. Variable leak at times. Compliance data downloaded and reviewed in detail with the patient today. During the past 30 days, APAP used during 28 days with the average daily use of 5.8 hours. CMS compliance criteria 82%. AHI 3.8 per hour. With APAP not experiencing excessive daytime sleepiness. [place] Sleepiness Scale: 7    No Known Allergies    Current Outpatient Medications   Medication Sig Dispense Refill    Vitamin D3 50 mcg (2,000 unit) cap capsule take 1 capsule by mouth daily 90 Capsule 0    amLODIPine (NORVASC) 10 mg tablet take 1 tablet by mouth once daily for blood pressure 90 Tablet 3    montelukast (SINGULAIR) 10 mg tablet Take 1 Tablet by mouth daily. 90 Tablet 3    lisinopriL (PRINIVIL, ZESTRIL) 40 mg tablet Take 1 Tablet by mouth daily. 90 Tablet 4    allopurinoL (ZYLOPRIM) 300 mg tablet Take 1 Tablet by mouth daily. 90 Tablet 3    omeprazole (PRILOSEC) 20 mg capsule take 1 capsule by mouth every morning 90 Capsule 3    metoprolol succinate (TOPROL-XL) 25 mg XL tablet Take 0.5 Tablets by mouth daily.  90 Tablet 3    rosuvastatin (CRESTOR) 40 mg tablet Take 1 Tablet by mouth nightly. 90 Tablet 3    aspirin delayed-release 81 mg tablet Take 81 mg by mouth daily. He  has a past medical history of CAD (coronary artery disease), Essential hypertension, Gout, and Hyperlipidemia. He  has a past surgical history that includes hx knee replacement (Left, 2017) and hx coronary stent placement (2014). He family history includes Diabetes in his brother, father, and son; Heart Attack in his brother; Heart Failure in his mother; Hypertension in his sister; No Known Problems in his daughter and son; Stroke in his father. He  reports that he has never smoked. He has never been exposed to tobacco smoke. He has never used smokeless tobacco. He reports that he does not currently use alcohol. He reports that he does not use drugs. Review of Systems:  Unchanged per patient      Objective:   Visit Vitals  BP (!) 142/86   Pulse 61   Ht 5' 10\" (1.778 m)   Wt 263 lb (119.3 kg)   SpO2 94%   BMI 37.74 kg/m²     Body mass index is 37.74 kg/m². General:   Conversant, cooperative       Oropharynx:   Mallampati score II, tongue normal                         Neuro:  Speech fluent, face symmetrical             Assessment:       ICD-10-CM ICD-9-CM    1. JOVI (obstructive sleep apnea)  G47.33 327.23           he is compliant with PAP therapy and PAP continues to benefit patient and remains necessary for control of his sleep apnea. Mask change may be beneficial.    Plan:   No orders of the defined types were placed in this encounter. *A copy of compliance data was provided to the patient and reviewed in detail. *CPAP will be continued at the above pressure settings. The patient is to contact the office if there are problems with either mask or pressure settings. Follow-up will be scheduled at which time compliance data will be reviewed. * Patient has a history and examination consistent with the diagnosis of sleep apnea.   * He was provided information on sleep apnea including corresponding risk factors and the importance of proper treatment. * Treatment options if indicated were reviewed today. Berkley Crooks MD, [facility]  Electronically signed 03/02/23        This note was created using voice recognition software. Despite editing, there may be syntax errors. This note will not be viewable in [address]

## 2023-03-06 ENCOUNTER — DOCUMENTATION ONLY (OUTPATIENT)
Dept: SLEEP MEDICINE | Age: 73
End: 2023-03-06

## 2023-03-31 RX ORDER — NITROGLYCERIN 0.4 MG/1
TABLET SUBLINGUAL
Qty: 25 TABLET | Refills: 1 | Status: SHIPPED | OUTPATIENT
Start: 2023-03-31

## 2023-04-24 RX ORDER — NICOTINE 11MG/24HR
PATCH, TRANSDERMAL 24 HOURS TRANSDERMAL
Qty: 90 CAPSULE | Refills: 0 | Status: SHIPPED | OUTPATIENT
Start: 2023-04-24

## 2023-06-09 RX ORDER — NITROGLYCERIN 0.4 MG/1
TABLET SUBLINGUAL
Qty: 25 TABLET | Refills: 2 | Status: SHIPPED | OUTPATIENT
Start: 2023-06-09

## 2023-06-16 ENCOUNTER — HOSPITAL ENCOUNTER (OUTPATIENT)
Facility: HOSPITAL | Age: 73
Setting detail: OUTPATIENT SURGERY
Discharge: HOME | End: 2023-06-16
Attending: INTERNAL MEDICINE | Admitting: INTERNAL MEDICINE
Payer: MEDICARE

## 2023-06-16 VITALS
SYSTOLIC BLOOD PRESSURE: 132 MMHG | DIASTOLIC BLOOD PRESSURE: 64 MMHG | HEART RATE: 76 BPM | OXYGEN SATURATION: 98 % | RESPIRATION RATE: 16 BRPM | TEMPERATURE: 98.4 F

## 2023-06-16 DIAGNOSIS — R10.13 DYSPEPSIA: ICD-10-CM

## 2023-06-16 DIAGNOSIS — K21.9 GASTROESOPHAGEAL REFLUX DISEASE, UNSPECIFIED WHETHER ESOPHAGITIS PRESENT: ICD-10-CM

## 2023-06-16 DIAGNOSIS — K44.0 DIAPHRAGMATIC HERNIA WITH OBSTRUCTION: ICD-10-CM

## 2023-06-16 PROCEDURE — 7100000010 HC PHASE II RECOVERY - FIRST 15 MIN: Performed by: INTERNAL MEDICINE

## 2023-06-16 PROCEDURE — 2580000003 HC RX 258: Performed by: INTERNAL MEDICINE

## 2023-06-16 PROCEDURE — 88305 TISSUE EXAM BY PATHOLOGIST: CPT

## 2023-06-16 PROCEDURE — 3700000000 HC ANESTHESIA ATTENDED CARE: Performed by: INTERNAL MEDICINE

## 2023-06-16 PROCEDURE — 2709999900 HC NON-CHARGEABLE SUPPLY: Performed by: INTERNAL MEDICINE

## 2023-06-16 PROCEDURE — 3600007502: Performed by: INTERNAL MEDICINE

## 2023-06-16 PROCEDURE — 3600007512: Performed by: INTERNAL MEDICINE

## 2023-06-16 PROCEDURE — 7100000011 HC PHASE II RECOVERY - ADDTL 15 MIN: Performed by: INTERNAL MEDICINE

## 2023-06-16 PROCEDURE — 3700000001 HC ADD 15 MINUTES (ANESTHESIA): Performed by: INTERNAL MEDICINE

## 2023-06-16 RX ORDER — SODIUM CHLORIDE 9 MG/ML
INJECTION, SOLUTION INTRAVENOUS CONTINUOUS
Status: DISCONTINUED | OUTPATIENT
Start: 2023-06-16 | End: 2023-06-16 | Stop reason: HOSPADM

## 2023-06-16 RX ORDER — SODIUM CHLORIDE, SODIUM LACTATE, POTASSIUM CHLORIDE, CALCIUM CHLORIDE 600; 310; 30; 20 MG/100ML; MG/100ML; MG/100ML; MG/100ML
INJECTION, SOLUTION INTRAVENOUS CONTINUOUS
Status: DISCONTINUED | OUTPATIENT
Start: 2023-06-16 | End: 2023-06-16 | Stop reason: HOSPADM

## 2023-06-16 RX ADMIN — SODIUM CHLORIDE, POTASSIUM CHLORIDE, SODIUM LACTATE AND CALCIUM CHLORIDE: 600; 310; 30; 20 INJECTION, SOLUTION INTRAVENOUS at 09:51

## 2023-06-16 ASSESSMENT — PAIN SCALES - GENERAL
PAINLEVEL_OUTOF10: 0
PAINLEVEL_OUTOF10: 0

## 2023-06-16 ASSESSMENT — PAIN - FUNCTIONAL ASSESSMENT: PAIN_FUNCTIONAL_ASSESSMENT: NONE - DENIES PAIN

## 2023-06-16 NOTE — PROGRESS NOTES
Patient has rash on bilateral arms and bilateral top of feet after working in the garden and cleaning up brush. Patient went to the doctor and they gave him a steroid, antibiotic, and a anti itch pill. They called and talked to endo unit and they said it was ok to proceed with procedure as long as the spots are not oozing.

## 2023-08-09 RX ORDER — GLUCOSAMINE/CHONDR SU A SOD 750-600 MG
TABLET ORAL
Qty: 90 CAPSULE | Refills: 1 | Status: SHIPPED | OUTPATIENT
Start: 2023-08-09

## 2023-08-31 RX ORDER — NITROGLYCERIN 0.4 MG/1
TABLET SUBLINGUAL
Qty: 25 TABLET | Refills: 2 | Status: SHIPPED | OUTPATIENT
Start: 2023-08-31

## 2023-10-16 ENCOUNTER — OFFICE VISIT (OUTPATIENT)
Age: 73
End: 2023-10-16
Payer: MEDICARE

## 2023-10-16 VITALS
OXYGEN SATURATION: 96 % | DIASTOLIC BLOOD PRESSURE: 80 MMHG | WEIGHT: 254 LBS | HEART RATE: 50 BPM | SYSTOLIC BLOOD PRESSURE: 150 MMHG | BODY MASS INDEX: 34.4 KG/M2 | HEIGHT: 72 IN

## 2023-10-16 DIAGNOSIS — E78.00 PURE HYPERCHOLESTEROLEMIA, UNSPECIFIED: ICD-10-CM

## 2023-10-16 DIAGNOSIS — E66.01 MORBID (SEVERE) OBESITY DUE TO EXCESS CALORIES (HCC): ICD-10-CM

## 2023-10-16 DIAGNOSIS — I25.10 ATHEROSCLEROSIS OF NATIVE CORONARY ARTERY OF NATIVE HEART WITHOUT ANGINA PECTORIS: ICD-10-CM

## 2023-10-16 DIAGNOSIS — I10 ESSENTIAL (PRIMARY) HYPERTENSION: Primary | ICD-10-CM

## 2023-10-16 DIAGNOSIS — R00.1 BRADYCARDIA: ICD-10-CM

## 2023-10-16 DIAGNOSIS — I20.9 ANGINA PECTORIS, UNSPECIFIED (HCC): ICD-10-CM

## 2023-10-16 PROBLEM — I25.119 ATHEROSCLEROTIC HEART DISEASE OF NATIVE CORONARY ARTERY WITH UNSPECIFIED ANGINA PECTORIS (HCC): Status: ACTIVE | Noted: 2023-10-16

## 2023-10-16 PROCEDURE — 3077F SYST BP >= 140 MM HG: CPT | Performed by: SPECIALIST

## 2023-10-16 PROCEDURE — 1123F ACP DISCUSS/DSCN MKR DOCD: CPT | Performed by: SPECIALIST

## 2023-10-16 PROCEDURE — 99214 OFFICE O/P EST MOD 30 MIN: CPT | Performed by: SPECIALIST

## 2023-10-16 PROCEDURE — 93010 ELECTROCARDIOGRAM REPORT: CPT | Performed by: SPECIALIST

## 2023-10-16 PROCEDURE — G8484 FLU IMMUNIZE NO ADMIN: HCPCS | Performed by: SPECIALIST

## 2023-10-16 PROCEDURE — 1036F TOBACCO NON-USER: CPT | Performed by: SPECIALIST

## 2023-10-16 PROCEDURE — 3017F COLORECTAL CA SCREEN DOC REV: CPT | Performed by: SPECIALIST

## 2023-10-16 PROCEDURE — G8427 DOCREV CUR MEDS BY ELIG CLIN: HCPCS | Performed by: SPECIALIST

## 2023-10-16 PROCEDURE — 3079F DIAST BP 80-89 MM HG: CPT | Performed by: SPECIALIST

## 2023-10-16 PROCEDURE — 93005 ELECTROCARDIOGRAM TRACING: CPT | Performed by: SPECIALIST

## 2023-10-16 PROCEDURE — G8417 CALC BMI ABV UP PARAM F/U: HCPCS | Performed by: SPECIALIST

## 2023-10-16 RX ORDER — NIFEDIPINE 60 MG/1
60 TABLET, EXTENDED RELEASE ORAL DAILY
Qty: 30 TABLET | Refills: 5 | Status: SHIPPED | OUTPATIENT
Start: 2023-10-16 | End: 2023-10-16 | Stop reason: SDUPTHER

## 2023-10-16 RX ORDER — NIFEDIPINE 60 MG/1
60 TABLET, EXTENDED RELEASE ORAL DAILY
Qty: 30 TABLET | Refills: 5 | Status: SHIPPED | OUTPATIENT
Start: 2023-10-16

## 2023-10-16 NOTE — PATIENT INSTRUCTIONS
1) your heart rate on your EKG today is still low in the 40s. I want you to discontinue your metoprolol at 12.5 mg a day. 2) I would stop your amlodipine at 10 mg a day because I do not think it is bringing your blood pressure down enough and place you on Procardia XL which is known as nifedipine at 60 mg and take this in the morning. 3) I want you to take the lisinopril 40 mg in the evening    4) I need you to follow your blood pressures daily at home for the next 10 days. If you feel like your blood pressure is too low on the 60 mg of Procardia let me know. 5) I need for you to come back in approximately 3 weeks for blood pressure check and EKG. 6) IF HEART RATE IS STILL SLOW WHEN RETURN WILL PLACE A HOLTER MONITOR ON YO FOR 2 DAYS. It is  my pleasure to have the opportunity to be involved in taking care of you and to provide you the best cardiac care. At the end of every visit I  encourage you to eat healthy and clean and reduce your red meat intake as well as exercise 30 minutes 5 days a week to ensure a healthy heart. If you are a smoker , it will be essential that you stop smoking to reduce your risk of heart disease. Part of providing you the best heart care possible IS AN ACCURATE KNOWLEDGE OF YOUR MEDICINE. It  will be  essential at each office visit that you provide me with an accurate list of your medicines. When you come into the office you should have that list or another option is lining up your pill bottles  and taking a snapshot with your cell phone of all the medicines you are taking currently and show it to the nurse in the examining room. Inaccurate reporting of your medication to the nurse may lead to adverse events and medical errors. Thank you again for giving me the opportunity to be part of your heart care and it is my pleasure. All the best,    Aaron Kwok MD

## 2023-10-16 NOTE — PROGRESS NOTES
CARDIOLOGY OFFICE NOTE    Aaron Kwok MD, [facility] [address], [place]  Phone [phone]; Fax [phone]  Mobile [phone]   Voice Mail [phone]    Primary care: Kailee Trujillo MD       ATTENTION:   This medical record was transcribed using an electronic medical records/speech recognition system. Although proofread, it may and can contain electronic, spelling and other errors. Corrections may be executed at a later time. Please feel free to contact us for any clarifications as needed. Mitra Larson. is a 72 y.o. male with  referred for CAD stenting LAD, dyslipidemia, hypertension, possible VIANEY . Cardiac risk factors: dyslipidemia, obesity, sedentary life style, male gender, hypertension   I have personally obtained the history from the patient. HISTORY OF PRESENTING ILLNESS    Ms./Mr. Mitra Larson.  72 y.o. is a gentleman with history of CAD dyslipidemia and hypertension. He was being followed by [facility] cardiology in [facility]. He has had a history of stenting in 2014. States he is not having chest pain or shortness of breath but his blood pressure is elevated. He has aortic root dimension of 6 cm and following this with echoes. Additionally his blood pressure is elevated today on the amlodipine and his heart rate remains low so we will stop metoprolol    Please see impression and plan below.      ACTIVE PROBLEM LIST     Patient Active Problem List    Diagnosis Date Noted    Conductive hearing loss in left ear 01/04/2023    Chronic pain of right knee 11/29/2022    Prediabetes 02/25/2022    Obesity, Class II, BMI 35-39.9 02/22/2022    Coronary artery disease involving native coronary artery of native heart without angina pectoris     Hyperlipidemia     Essential hypertension     Gastroesophageal reflux disease 03/11/2021    Environmental and seasonal allergies 03/11/2021    Gout            PAST

## 2023-10-25 ENCOUNTER — TELEPHONE (OUTPATIENT)
Age: 73
End: 2023-10-25

## 2023-10-25 NOTE — TELEPHONE ENCOUNTER
Pt concerned with elevated BP since starting Nifedipine on 55/31/48 735-666'Z systolic and is having dizziness.

## 2023-10-25 NOTE — TELEPHONE ENCOUNTER
Pt's wife called and stated the pt's bp meds were changed on 10/16, pt has been taking them but the wife stated the bp readings are higher than before and the pt is also experiencing dizziness, lft bp readings on mychart, transferred call to the nurse.

## 2023-10-26 ENCOUNTER — TELEPHONE (OUTPATIENT)
Age: 73
End: 2023-10-26

## 2023-10-26 NOTE — TELEPHONE ENCOUNTER
Discussed with Dr Lyn Mondragon and informed wife that he would like pt to cut Lisinopril in half and take 20 mg in am and 20 mg in pm. Take Nifedipine 60 mg at lunch. He is to take BP standing and call next week if BP/dizziness does not improve.

## 2023-10-26 NOTE — TELEPHONE ENCOUNTER
Spouse call to discuss pt's conditions since he's been on the new medication.         Mrs Pinzon Vestal # [phone]

## 2023-10-30 LAB
ALBUMIN SERPL-MCNC: 4 G/DL (ref 3.8–4.8)
ALP SERPL-CCNC: 84 IU/L (ref 44–121)
ALT SERPL-CCNC: 17 IU/L (ref 0–44)
AST SERPL-CCNC: 15 IU/L (ref 0–40)
BILIRUB DIRECT SERPL-MCNC: 0.12 MG/DL (ref 0–0.4)
BILIRUB SERPL-MCNC: 0.3 MG/DL (ref 0–1.2)
CHOLEST SERPL-MCNC: 129 MG/DL (ref 100–199)
HDLC SERPL-MCNC: 53 MG/DL
LDLC SERPL CALC-MCNC: 58 MG/DL (ref 0–99)
PROT SERPL-MCNC: 6.6 G/DL (ref 6–8.5)
TRIGL SERPL-MCNC: 98 MG/DL (ref 0–149)
VLDLC SERPL CALC-MCNC: 18 MG/DL (ref 5–40)

## 2023-11-03 ENCOUNTER — TELEPHONE (OUTPATIENT)
Age: 73
End: 2023-11-03

## 2023-11-03 NOTE — TELEPHONE ENCOUNTER
Pts wife (basilia) called to give pts BP readings, basilia also mentioned that the pt is experiencing neck discomfort, lightheadedness and ringing ears.    10/24 153/78   10/25 159/87   10/26 141/76  10/27 170/89  10/28 144/75   10/29 140/78   10/30 116/63  10/31 152/88  11/2 178/85

## 2023-11-06 NOTE — TELEPHONE ENCOUNTER
This is the instructions given to pt 10/26/23-Discussed with Dr Benavides and informed wife that he would like pt to cut Lisinopril in half and take 20 mg in am and 20 mg in pm. Take Nifedipine 60 mg at lunch. He is to take BP standing and call next week if BP/dizziness does not improve

## 2023-11-06 NOTE — TELEPHONE ENCOUNTER
Aaron Benavides MD  You 47 minutes ago (11:56 AM)       I would increase the nifedipine XL to 90 mg the ringing in his ears may be from hypertension

## 2023-11-08 ENCOUNTER — OFFICE VISIT (OUTPATIENT)
Age: 73
End: 2023-11-08
Payer: MEDICARE

## 2023-11-08 VITALS
WEIGHT: 260 LBS | DIASTOLIC BLOOD PRESSURE: 76 MMHG | SYSTOLIC BLOOD PRESSURE: 133 MMHG | BODY MASS INDEX: 35.26 KG/M2 | RESPIRATION RATE: 14 BRPM

## 2023-11-08 DIAGNOSIS — R33.9 INCOMPLETE BLADDER EMPTYING: ICD-10-CM

## 2023-11-08 DIAGNOSIS — R97.20 ELEVATED PSA: ICD-10-CM

## 2023-11-08 DIAGNOSIS — R39.15 URGENCY OF URINATION: ICD-10-CM

## 2023-11-08 DIAGNOSIS — R35.1 NOCTURIA: ICD-10-CM

## 2023-11-08 DIAGNOSIS — R39.12 WEAK URINARY STREAM: ICD-10-CM

## 2023-11-08 DIAGNOSIS — N39.43 POST-VOID DRIBBLING: Primary | ICD-10-CM

## 2023-11-08 LAB
BILIRUBIN, URINE, POC: NEGATIVE
BLOOD URINE, POC: NEGATIVE
GLUCOSE URINE, POC: NEGATIVE
KETONES, URINE, POC: NEGATIVE
LEUKOCYTE ESTERASE, URINE, POC: NEGATIVE
NITRITE, URINE, POC: NEGATIVE
PH, URINE, POC: 5.5 (ref 4.6–8)
PROTEIN,URINE, POC: NEGATIVE
SPECIFIC GRAVITY, URINE, POC: 1.02 (ref 1–1.03)
URINALYSIS CLARITY, POC: CLEAR
URINALYSIS COLOR, POC: YELLOW
UROBILINOGEN, POC: NORMAL

## 2023-11-08 PROCEDURE — 1036F TOBACCO NON-USER: CPT

## 2023-11-08 PROCEDURE — 3075F SYST BP GE 130 - 139MM HG: CPT

## 2023-11-08 PROCEDURE — 3078F DIAST BP <80 MM HG: CPT

## 2023-11-08 PROCEDURE — 99213 OFFICE O/P EST LOW 20 MIN: CPT

## 2023-11-08 PROCEDURE — 81003 URINALYSIS AUTO W/O SCOPE: CPT

## 2023-11-08 PROCEDURE — G8417 CALC BMI ABV UP PARAM F/U: HCPCS

## 2023-11-08 PROCEDURE — PBSHW AMB POC URINALYSIS DIP STICK AUTO W/O MICRO

## 2023-11-08 PROCEDURE — 1123F ACP DISCUSS/DSCN MKR DOCD: CPT

## 2023-11-08 PROCEDURE — 3017F COLORECTAL CA SCREEN DOC REV: CPT

## 2023-11-08 PROCEDURE — G8484 FLU IMMUNIZE NO ADMIN: HCPCS

## 2023-11-08 PROCEDURE — G8427 DOCREV CUR MEDS BY ELIG CLIN: HCPCS

## 2023-11-08 RX ORDER — TAMSULOSIN HYDROCHLORIDE 0.4 MG/1
0.4 CAPSULE ORAL DAILY
Qty: 30 CAPSULE | Refills: 1 | Status: SHIPPED | OUTPATIENT
Start: 2023-11-08

## 2023-11-08 SDOH — ECONOMIC STABILITY: HOUSING INSECURITY
IN THE LAST 12 MONTHS, WAS THERE A TIME WHEN YOU DID NOT HAVE A STEADY PLACE TO SLEEP OR SLEPT IN A SHELTER (INCLUDING NOW)?: NO

## 2023-11-08 SDOH — ECONOMIC STABILITY: FOOD INSECURITY: WITHIN THE PAST 12 MONTHS, THE FOOD YOU BOUGHT JUST DIDN'T LAST AND YOU DIDN'T HAVE MONEY TO GET MORE.: NEVER TRUE

## 2023-11-08 SDOH — ECONOMIC STABILITY: INCOME INSECURITY: HOW HARD IS IT FOR YOU TO PAY FOR THE VERY BASICS LIKE FOOD, HOUSING, MEDICAL CARE, AND HEATING?: NOT HARD AT ALL

## 2023-11-08 SDOH — ECONOMIC STABILITY: FOOD INSECURITY: WITHIN THE PAST 12 MONTHS, YOU WORRIED THAT YOUR FOOD WOULD RUN OUT BEFORE YOU GOT MONEY TO BUY MORE.: NEVER TRUE

## 2023-11-08 ASSESSMENT — PATIENT HEALTH QUESTIONNAIRE - PHQ9
2. FEELING DOWN, DEPRESSED OR HOPELESS: 0
SUM OF ALL RESPONSES TO PHQ QUESTIONS 1-9: 0
1. LITTLE INTEREST OR PLEASURE IN DOING THINGS: 0
SUM OF ALL RESPONSES TO PHQ9 QUESTIONS 1 & 2: 0

## 2023-11-08 NOTE — PROGRESS NOTES
CARDIOLOGY OFFICE NOTE    [facility] [address], [place]  Phone [phone]; Fax [phone]    Primary Cardiologist: Dmitriy Reynoso. Shabana Lambert MD, [facility]  Last Office Visit: 10/16/23    Primary care: Sarah Reyes MD      Jayshree Bauer. is a 72 y.o. male with  referred for CAD stenting LAD, dyslipidemia, hypertension, possible VIANEY . Cardiac risk factors: dyslipidemia, obesity, sedentary life style, male gender, hypertension  I have personally obtained the history from the patient. HISTORY OF PRESENTING ILLNESS   Mr. Jayshree Bauer.  72 y.o. is a gentleman with history of CAD dyslipidemia and hypertension. He was being followed by [facility] cardiology in . He has had a history of stenting in 2014. BP remains elevated today in office and at home. Log reflects 145-170/80-90, HR 70s. Had called the office and medication changes were made. Denies chest pain, shortness of breath, palpitations.          ACTIVE PROBLEM LIST     Patient Active Problem List    Diagnosis Date Noted    Angina pectoris, unspecified 10/16/2023    Atherosclerotic heart disease of native coronary artery with unspecified angina pectoris 10/16/2023    Conductive hearing loss in left ear 01/04/2023    Chronic pain of right knee 11/29/2022    Prediabetes 02/25/2022    Obesity, Class II, BMI 35-39.9 02/22/2022    Coronary artery disease involving native coronary artery of native heart without angina pectoris     Hyperlipidemia     Essential hypertension     Gastroesophageal reflux disease 03/11/2021    Environmental and seasonal allergies 03/11/2021    Gout            PAST MEDICAL HISTORY     Past Medical History:   Diagnosis Date    Asthma     CAD (coronary artery disease)     Essential hypertension     Gout     Hyperlipidemia            PAST SURGICAL HISTORY     Past Surgical History:   Procedure Laterality Date    CORONARY ANGIOPLASTY WITH STENT PLACEMENT  2014    TOTAL

## 2023-11-09 ENCOUNTER — OFFICE VISIT (OUTPATIENT)
Age: 73
End: 2023-11-09
Payer: MEDICARE

## 2023-11-09 VITALS
WEIGHT: 260 LBS | BODY MASS INDEX: 35.21 KG/M2 | DIASTOLIC BLOOD PRESSURE: 74 MMHG | OXYGEN SATURATION: 97 % | HEIGHT: 72 IN | HEART RATE: 69 BPM | SYSTOLIC BLOOD PRESSURE: 142 MMHG

## 2023-11-09 DIAGNOSIS — I10 ESSENTIAL (PRIMARY) HYPERTENSION: Primary | ICD-10-CM

## 2023-11-09 DIAGNOSIS — I77.810 DILATED AORTIC ROOT (HCC): ICD-10-CM

## 2023-11-09 DIAGNOSIS — E78.00 PURE HYPERCHOLESTEROLEMIA, UNSPECIFIED: ICD-10-CM

## 2023-11-09 DIAGNOSIS — I25.10 ATHEROSCLEROSIS OF NATIVE CORONARY ARTERY OF NATIVE HEART WITHOUT ANGINA PECTORIS: ICD-10-CM

## 2023-11-09 PROCEDURE — 99214 OFFICE O/P EST MOD 30 MIN: CPT

## 2023-11-09 PROCEDURE — 93005 ELECTROCARDIOGRAM TRACING: CPT

## 2023-11-09 PROCEDURE — G8417 CALC BMI ABV UP PARAM F/U: HCPCS

## 2023-11-09 PROCEDURE — 3078F DIAST BP <80 MM HG: CPT

## 2023-11-09 PROCEDURE — 3077F SYST BP >= 140 MM HG: CPT

## 2023-11-09 PROCEDURE — 93010 ELECTROCARDIOGRAM REPORT: CPT

## 2023-11-09 PROCEDURE — 1123F ACP DISCUSS/DSCN MKR DOCD: CPT

## 2023-11-09 PROCEDURE — G8427 DOCREV CUR MEDS BY ELIG CLIN: HCPCS

## 2023-11-09 PROCEDURE — 1036F TOBACCO NON-USER: CPT

## 2023-11-09 PROCEDURE — G8484 FLU IMMUNIZE NO ADMIN: HCPCS

## 2023-11-09 PROCEDURE — 3017F COLORECTAL CA SCREEN DOC REV: CPT

## 2023-11-09 RX ORDER — LISINOPRIL 20 MG/1
20 TABLET ORAL 2 TIMES DAILY
Qty: 180 TABLET | Refills: 1 | Status: SHIPPED | COMMUNITY
Start: 2023-11-09

## 2023-11-09 RX ORDER — NIFEDIPINE 90 MG/1
90 TABLET, EXTENDED RELEASE ORAL DAILY
Qty: 90 TABLET | Refills: 1 | Status: SHIPPED | OUTPATIENT
Start: 2023-11-09

## 2023-11-09 NOTE — PROGRESS NOTES
Chief Complaint   Patient presents with    Hypertension    Hyperlipidemia    Other     JAGDISH     Vitals:    11/09/23 1122   BP: (!) 142/74   Site: Left Upper Arm   Position: Sitting   Pulse: 69   SpO2: 97%   Weight: 117.9 kg (260 lb)   Height: 1.829 m (6')       Chest pain: DENIED     SOB: DENIED     Palpitations: FEELS/HEAR BEATS OFTEN     Swelling in hands/feet: DENIED     Dizziness: LIGHTHEADEDNESS (POSSIBLY FROM MEDS)     Recent hospital stays: DENIED     Refills: DENIED

## 2023-11-09 NOTE — PATIENT INSTRUCTIONS
Please take lisinopril 20 mg, twice daily ( this is cutting the pill in half and taking twice daily)  Please take procardia XL 90 mg daily (this is an increase in medication)

## 2023-11-12 DIAGNOSIS — R97.20 ELEVATED PSA: ICD-10-CM

## 2023-11-12 DIAGNOSIS — R39.12 WEAK URINARY STREAM: ICD-10-CM

## 2023-11-12 DIAGNOSIS — R35.1 NOCTURIA: ICD-10-CM

## 2023-11-12 DIAGNOSIS — R33.9 INCOMPLETE BLADDER EMPTYING: ICD-10-CM

## 2023-11-12 DIAGNOSIS — R39.15 URGENCY OF URINATION: ICD-10-CM

## 2023-11-12 DIAGNOSIS — N39.43 POST-VOID DRIBBLING: ICD-10-CM

## 2023-11-12 NOTE — TELEPHONE ENCOUNTER
Medication Refill Request    Tone YURI Briones Jr. is requesting a refill of the following medication(s):   Requested Prescriptions     Pending Prescriptions Disp Refills    tamsulosin (FLOMAX) 0.4 MG capsule 30 capsule 1     Sig: Take 1 capsule by mouth daily 30 minutes after meal        Listed PCP is Micheal Lacey MD   Last provider to prescribe medication: Dr. Youngblood  Last Date of Medication Prescribed: 11/8/23   Date of Last Office Visit at [facility]: 11/8/23   FUTURE APPOINTMENT:   Future Appointments   Date Time Provider Department Center   12/11/2023 10:40 AM Micheal Lacey MD [facility] BS AMB   12/28/2023  3:00 PM Vania Mcghee, APRN - NP [facility] BS AMB   3/5/2024  2:00 PM Omi Vasquez MD [facility] AMB       Please send refill to:    [id number] - [place] - [address] -  [phone] - F [phone]  [address] [address]  Phone: [phone] Fax: [phone]      Please review request and approve or deny with recommendations.

## 2023-11-16 RX ORDER — TAMSULOSIN HYDROCHLORIDE 0.4 MG/1
0.4 CAPSULE ORAL DAILY
Qty: 30 CAPSULE | Refills: 1 | OUTPATIENT
Start: 2023-11-16

## 2023-11-29 RX ORDER — HYDRALAZINE HYDROCHLORIDE 25 MG/1
25 TABLET, FILM COATED ORAL 2 TIMES DAILY
COMMUNITY
End: 2023-11-29 | Stop reason: SDUPTHER

## 2023-11-29 RX ORDER — HYDRALAZINE HYDROCHLORIDE 25 MG/1
25 TABLET, FILM COATED ORAL 2 TIMES DAILY
Qty: 60 TABLET | Refills: 5 | Status: SHIPPED | OUTPATIENT
Start: 2023-11-29

## 2023-11-30 ENCOUNTER — PATIENT MESSAGE (OUTPATIENT)
Age: 73
End: 2023-11-30

## 2023-11-30 NOTE — TELEPHONE ENCOUNTER
From: Ronel Gautam Citizen. To: Serjio Triplett  Sent: 11/30/2023 11:11 AM EST  Subject: New BP meds    so I need to start this new blood pressure medicine, take it twice a day. Do I still take 1/2 lisinopril in the morning and 1/2 lisinopril in evening?

## 2023-12-15 ENCOUNTER — TELEPHONE (OUTPATIENT)
Dept: ADMINISTRATIVE | Age: 73
End: 2023-12-15

## 2023-12-15 NOTE — TELEPHONE ENCOUNTER
PT'S WIFE CALLED STATING THAT PT WA SEEN FOR A PRE OP CLEARANCE PHYSICAL THE OTHER DAY BUT FORGOT TO BRING THE FORM THAT NEEDS FILLED OUT; THE SURGEON'S OFFICE IS FAXING THE FORM OVER BUT PT IS SCHEDULED FOR SURGERY ON 12/18 AND NEEDS THIS FORM FILLED OUT ASAP; PT WILL  MONDAY 12/18 MORNING; PLEASE ADVISE

## 2023-12-29 DIAGNOSIS — N39.43 POST-VOID DRIBBLING: ICD-10-CM

## 2023-12-29 DIAGNOSIS — R39.12 WEAK URINARY STREAM: ICD-10-CM

## 2023-12-29 DIAGNOSIS — R33.9 INCOMPLETE BLADDER EMPTYING: ICD-10-CM

## 2023-12-29 DIAGNOSIS — R39.15 URGENCY OF URINATION: ICD-10-CM

## 2023-12-29 DIAGNOSIS — R35.1 NOCTURIA: ICD-10-CM

## 2023-12-29 DIAGNOSIS — R97.20 ELEVATED PSA: ICD-10-CM

## 2023-12-29 RX ORDER — ALLOPURINOL 300 MG/1
300 TABLET ORAL DAILY
Qty: 90 TABLET | Refills: 1 | Status: SHIPPED | OUTPATIENT
Start: 2023-12-29

## 2024-01-02 RX ORDER — TAMSULOSIN HYDROCHLORIDE 0.4 MG/1
CAPSULE ORAL
Qty: 90 CAPSULE | Refills: 1 | Status: SHIPPED | OUTPATIENT
Start: 2024-01-02

## 2024-01-05 RX ORDER — MONTELUKAST SODIUM 10 MG/1
10 TABLET ORAL DAILY
Qty: 90 TABLET | Refills: 1 | Status: SHIPPED | OUTPATIENT
Start: 2024-01-05

## 2024-01-18 ENCOUNTER — OFFICE VISIT (OUTPATIENT)
Age: 74
End: 2024-01-18
Payer: MEDICARE

## 2024-01-18 VITALS
DIASTOLIC BLOOD PRESSURE: 70 MMHG | HEIGHT: 72 IN | HEART RATE: 57 BPM | BODY MASS INDEX: 35.62 KG/M2 | OXYGEN SATURATION: 96 % | WEIGHT: 263 LBS | SYSTOLIC BLOOD PRESSURE: 136 MMHG

## 2024-01-18 DIAGNOSIS — I77.810 DILATED AORTIC ROOT (HCC): ICD-10-CM

## 2024-01-18 DIAGNOSIS — E66.01 SEVERE OBESITY (BMI 35.0-39.9) WITH COMORBIDITY (HCC): ICD-10-CM

## 2024-01-18 DIAGNOSIS — I10 ESSENTIAL (PRIMARY) HYPERTENSION: Primary | ICD-10-CM

## 2024-01-18 DIAGNOSIS — R00.1 BRADYCARDIA: ICD-10-CM

## 2024-01-18 PROCEDURE — 3075F SYST BP GE 130 - 139MM HG: CPT

## 2024-01-18 PROCEDURE — G8417 CALC BMI ABV UP PARAM F/U: HCPCS

## 2024-01-18 PROCEDURE — 1036F TOBACCO NON-USER: CPT

## 2024-01-18 PROCEDURE — 3017F COLORECTAL CA SCREEN DOC REV: CPT

## 2024-01-18 PROCEDURE — 1123F ACP DISCUSS/DSCN MKR DOCD: CPT

## 2024-01-18 PROCEDURE — G8427 DOCREV CUR MEDS BY ELIG CLIN: HCPCS

## 2024-01-18 PROCEDURE — G8484 FLU IMMUNIZE NO ADMIN: HCPCS

## 2024-01-18 PROCEDURE — 99214 OFFICE O/P EST MOD 30 MIN: CPT

## 2024-01-18 PROCEDURE — 3078F DIAST BP <80 MM HG: CPT

## 2024-01-18 NOTE — PROGRESS NOTES
Chief Complaint   Patient presents with    Follow-up     2 months; Angelita pt    Hyperlipidemia    Hypertension     Vitals:    01/18/24 1432   BP: 136/70   Site: Left Upper Arm   Position: Sitting   Cuff Size: Large Adult   Pulse: 57   SpO2: 96%   Weight: 119.3 kg (263 lb)   Height: 1.829 m (6')     Chest pain denied   Recent hospital stays denied   Refills denied     Since started taking BP meds, having more frequent h/a and \"swirling\" in head.  124 prior to taking meds.  Can get up to 180's.  
  2. Dilated aortic root (HCC)  I77.810       3. Severe obesity (BMI 35.0-39.9) with comorbidity (HCC)  E66.01              EKG 11/9/23 - NSR with PVC HR 63 bpm     ASSESSMENT/RECOMMENDATIONS:.       1 hypertension   -BB previously d/c'd for HR of 49 bpm   - ask that he take lisinopril 40 mg PM, amlodipine 5 mg AM  - BP better controlled in office today.       2 dyslipidemia   - last LDL 58 from 10/27/23  - continue crestor 40 mg daily          3 CAD/shortness of breath   -History of stenting of the LAD 2014  - continue statin, aspirin   -Last stress test was in May 2021   -Recent echo on 10/6/2022 shows normal EF concentric LVH  -No chest discomfort, does not require further cardiac testing this time      4. VIANEY   -He says he uses CPAP intermittently      5.  Obesity class II   - BMI 35  - encourage weight loss      6.  Slight aortic root dilatation of around 4 cm   -Follow-up echo in the next 6 months    7.  Bradycardia  -Heart rate remains in the 40s and he says its been chronically low in the past  -Discontinue metoprolol 12.5 mg daily  -Refrain from negative chronotropic agents  - EKG today 11/9/23 - NSR - HR 63 bpm with PVC     8. Headaches  - says they started since stopping metoprolol  - they are tolerable and have been getting better  - discussed if they were to become intolerable to go to ER  - asked that he see PCP for headaches     Follow-up with me in 1 month.     No orders of the defined types were placed in this encounter.    Vania Mcghee NP    [facility]  [facility]  [address], [address]  [address]  [place], [place]  [address]  [place] [address]  Ph: [phone]   Ph [phone]

## 2024-01-18 NOTE — PATIENT INSTRUCTIONS
Please take amlodipine 5 mg in AM and lisinopril 40 mg PM.     Parameters:  Systolic (top number) 100-160  Diastolic (bottom number) 50-99  Heart rate       How to get your blood pressure checked:   Before  During  After    In the 30 minutes before your BP is taken:   NO Smoking   NO Caffeine   NO Exercise  Make sure the cuff is the right size and in the right place.  Wait 5 minutes and retake your BP if needed. In case of machine error.    Keep your cuffed arm on a flat surface, like a table, and at heart level. Cuff should be at heart level not your arm Keep a log and bring it to every check up.    Sit STILL for 5 minutes prior to taking your BP.  Sit upright, back straight, feet flat on the floor.        Do not check your blood pressure more then once at a time, repeated attempts will only increase the numbers. Only take your B/P twice a day at least an hour after taking your medication. Preferably once in the morning and once in the evening. If you get 3 or more consecutive readings outside of these parameters or have symptoms please let us know so that we can adjust your medication.      If you recently have started, stopped, or changed a medication dosage please give your body at least a week or two to get use to the change.    Avoid high-sodium foods  Avoid eating:  Smoked, cured, salted, and canned meat, fish, and poultry.  Ham, cordero, hot dogs, and luncheon meats.  Regular, hard, and processed cheese and regular peanut butter.  Crackers with salted tops, and other salted snack foods such as pretzels, chips, and salted popcorn.  Frozen prepared meals, unless labeled low-sodium.  Canned and dried soups, broths, and bouillon, unless labeled sodium-free or low-sodium.  Canned vegetables, unless labeled sodium-free or low-sodium.  French fries, pizza, tacos, and other fast foods.  Pickles, olives, ketchup, and other condiments, especially soy sauce, unless labeled sodium-free or low-sodium.

## 2024-01-30 RX ORDER — ROSUVASTATIN CALCIUM 40 MG/1
TABLET, COATED ORAL NIGHTLY
Qty: 90 TABLET | Refills: 3 | Status: SHIPPED | OUTPATIENT
Start: 2024-01-30

## 2024-02-02 RX ORDER — GLUCOSAMINE/CHONDR SU A SOD 750-600 MG
TABLET ORAL
Qty: 90 CAPSULE | Refills: 1 | Status: SHIPPED | OUTPATIENT
Start: 2024-02-02

## (undated) DEVICE — CANNULA NSL O2 AD 7 FT END-TIDAL CARBON DIOX VENTFLO

## (undated) DEVICE — FORCEPS BX 240CM 2.4MM L NDL RAD JAW 4 M00513334

## (undated) DEVICE — BLOCK BITE STD GRN ORAL AD W/O STRP SLD PLAS DISP BITEGARD

## (undated) DEVICE — PROCEDURE KIT BX00717111